# Patient Record
Sex: MALE | Race: WHITE | Employment: OTHER | URBAN - METROPOLITAN AREA
[De-identification: names, ages, dates, MRNs, and addresses within clinical notes are randomized per-mention and may not be internally consistent; named-entity substitution may affect disease eponyms.]

---

## 2017-01-09 DIAGNOSIS — I10 ESSENTIAL HYPERTENSION WITH GOAL BLOOD PRESSURE LESS THAN 140/90: Primary | ICD-10-CM

## 2017-01-09 NOTE — TELEPHONE ENCOUNTER
Potassium chloride      Last Written Prescription Date: 10/07/16  Last Fill Quantity: 90, # refills: 0  Last Office Visit with [facility], [facility] or [facility] prescribing provider: 11/21/16       POTASSIUM   Date Value Ref Range Status   11/21/2016 4.7 3.4 - 5.3 mmol/L Final     CREATININE   Date Value Ref Range Status   11/21/2016 1.02 0.66 - 1.25 mg/dL Final     BP Readings from Last 3 Encounters:   11/21/16 118/60   08/29/16 150/70   07/15/16 112/62

## 2017-01-12 RX ORDER — POTASSIUM CHLORIDE 750 MG/1
TABLET, EXTENDED RELEASE ORAL
Qty: 90 TABLET | Refills: 2 | Status: SHIPPED | OUTPATIENT
Start: 2017-01-12 | End: 2018-01-01

## 2017-02-12 DIAGNOSIS — E78.5 HYPERLIPIDEMIA WITH TARGET LDL LESS THAN 100: ICD-10-CM

## 2017-02-13 NOTE — TELEPHONE ENCOUNTER
ATORVASTATIN     Last Written Prescription Date: 7/28/16  Last Fill Quantity: 90, # refills: 1  Last Office Visit with [facility], P or [facility] prescribing provider: 11/21/16       Lab Results   Component Value Date    CHOL 121 11/21/2016     Lab Results   Component Value Date    HDL 49 11/21/2016     Lab Results   Component Value Date    LDL 51 11/21/2016     Lab Results   Component Value Date    TRIG 107 11/21/2016     Lab Results   Component Value Date    CHOLHDLRATIO 2.5 06/30/2015

## 2017-02-15 RX ORDER — ATORVASTATIN CALCIUM 40 MG/1
TABLET, FILM COATED ORAL
Qty: 90 TABLET | Refills: 2 | Status: SHIPPED | OUTPATIENT
Start: 2017-02-15 | End: 2017-12-03

## 2017-02-15 NOTE — TELEPHONE ENCOUNTER
Prescription approved per [facility] Refill Protocol.  Alyx Foss, RN, BSN

## 2017-03-02 DIAGNOSIS — I10 ESSENTIAL HYPERTENSION: Primary | ICD-10-CM

## 2017-03-02 NOTE — TELEPHONE ENCOUNTER
Lisinopril 40 mg      Last Written Prescription Date:   Last Fill Quantity: , # refills:   Last Office Visit with [facility], [facility] or [facility] prescribing provider: 11/21/2016       Potassium   Date Value Ref Range Status   11/21/2016 4.7 3.4 - 5.3 mmol/L Final     Creatinine   Date Value Ref Range Status   11/21/2016 1.02 0.66 - 1.25 mg/dL Final     BP Readings from Last 3 Encounters:   11/21/16 118/60   08/29/16 150/70   07/15/16 112/62

## 2017-03-03 RX ORDER — LISINOPRIL 40 MG/1
TABLET ORAL
Qty: 180 TABLET | Refills: 0 | Status: SHIPPED | OUTPATIENT
Start: 2017-03-03 | End: 2017-05-08

## 2017-03-03 NOTE — TELEPHONE ENCOUNTER
Routing refill request to provider for review/approval because:  Medication is reported/historical  Ayla Lr RN

## 2017-03-21 DIAGNOSIS — E11.65 TYPE 2 DIABETES MELLITUS WITH HYPERGLYCEMIA, WITHOUT LONG-TERM CURRENT USE OF INSULIN (H): Primary | ICD-10-CM

## 2017-03-21 NOTE — TELEPHONE ENCOUNTER
Jentadueto         Last Written Prescription Date: 8/5/2016  Last Fill Quantity: 180, # refills: 1  Last Office Visit with [facility], P or [facility] prescribing provider:  11/21/2016        BP Readings from Last 3 Encounters:   11/21/16 118/60   08/29/16 150/70   07/15/16 112/62     Lab Results   Component Value Date    MICROL 42 11/21/2016     No results found for: MICROALBUMIN  Creatinine   Date Value Ref Range Status   11/21/2016 1.02 0.66 - 1.25 mg/dL Final   ]  GFR Estimate   Date Value Ref Range Status   11/21/2016 74 >60 mL/min/1.7m2 Final     Comment:     Non  GFR Calc   01/19/2016 65 >60 mL/min/1.7m2 Final     Comment:     Non  GFR Calc   10/12/2015 >60 ml/min/1.73m2 Final     GFR Estimate If Black   Date Value Ref Range Status   11/21/2016 89 >60 mL/min/1.7m2 Final     Comment:      GFR Calc   01/19/2016 79 >60 mL/min/1.7m2 Final     Comment:      GFR Calc   10/12/2015 >60 ml/min/1.73m2 Final     Lab Results   Component Value Date    CHOL 121 11/21/2016     Lab Results   Component Value Date    HDL 49 11/21/2016     Lab Results   Component Value Date    LDL 51 11/21/2016     Lab Results   Component Value Date    TRIG 107 11/21/2016     Lab Results   Component Value Date    CHOLHDLRATIO 2.5 06/30/2015     Lab Results   Component Value Date    AST 10 11/21/2016     Lab Results   Component Value Date    ALT 22 11/21/2016     Lab Results   Component Value Date    A1C 5.7 11/21/2016    A1C 5.4 08/29/2016    A1C 5.5 05/17/2016    A1C 5.9 01/19/2016    A1C 5.8 06/30/2015     Potassium   Date Value Ref Range Status   11/21/2016 4.7 3.4 - 5.3 mmol/L Final       Gena Moreira MA

## 2017-03-23 RX ORDER — LINAGLIPTIN AND METFORMIN HYDROCHLORIDE 2.5; 1 MG/1; MG/1
TABLET, FILM COATED ORAL
Qty: 180 TABLET | Refills: 1 | Status: SHIPPED | OUTPATIENT
Start: 2017-03-23 | End: 2017-09-24

## 2017-03-23 NOTE — TELEPHONE ENCOUNTER
Jentadueto   Routing refill request to provider for review/approval because:  Labs out of range:  Microalbumin  Break in medication.  Frannie Hutchins RN

## 2017-04-10 DIAGNOSIS — E11.65 TYPE 2 DIABETES MELLITUS WITH HYPERGLYCEMIA, WITHOUT LONG-TERM CURRENT USE OF INSULIN (H): Primary | ICD-10-CM

## 2017-04-10 NOTE — TELEPHONE ENCOUNTER
actos         Last Written Prescription Date: 09/07/16  Last Fill Quantity: 90, # refills: 1  Last Office Visit with [facility], P or [facility] prescribing provider:  11/21/16        BP Readings from Last 3 Encounters:   11/21/16 118/60   08/29/16 150/70   07/15/16 112/62     Lab Results   Component Value Date    MICROL 42 11/21/2016     Lab Results   Component Value Date    UMALCR 18.87 11/21/2016     Creatinine   Date Value Ref Range Status   11/21/2016 1.02 0.66 - 1.25 mg/dL Final   ]  GFR Estimate   Date Value Ref Range Status   11/21/2016 74 >60 mL/min/1.7m2 Final     Comment:     Non  GFR Calc   01/19/2016 65 >60 mL/min/1.7m2 Final     Comment:     Non  GFR Calc   10/12/2015 >60 ml/min/1.73m2 Final     GFR Estimate If Black   Date Value Ref Range Status   11/21/2016 89 >60 mL/min/1.7m2 Final     Comment:      GFR Calc   01/19/2016 79 >60 mL/min/1.7m2 Final     Comment:      GFR Calc   10/12/2015 >60 ml/min/1.73m2 Final     Lab Results   Component Value Date    CHOL 121 11/21/2016     Lab Results   Component Value Date    HDL 49 11/21/2016     Lab Results   Component Value Date    LDL 51 11/21/2016     Lab Results   Component Value Date    TRIG 107 11/21/2016     Lab Results   Component Value Date    CHOLHDLRATIO 2.5 06/30/2015     Lab Results   Component Value Date    AST 10 11/21/2016     Lab Results   Component Value Date    ALT 22 11/21/2016     Lab Results   Component Value Date    A1C 5.7 11/21/2016    A1C 5.4 08/29/2016    A1C 5.5 05/17/2016    A1C 5.9 01/19/2016    A1C 5.8 06/30/2015     Potassium   Date Value Ref Range Status   11/21/2016 4.7 3.4 - 5.3 mmol/L Final

## 2017-04-12 RX ORDER — PIOGLITAZONEHYDROCHLORIDE 30 MG/1
TABLET ORAL
Qty: 90 TABLET | Refills: 1 | Status: SHIPPED | OUTPATIENT
Start: 2017-04-12 | End: 2017-10-16

## 2017-04-12 NOTE — TELEPHONE ENCOUNTER
Routing refill request to provider for review/approval because:  Labs out of range:  microalbumin  Patient needs to be seen because:  Due for follow up in January  Ayla Lr RN

## 2017-04-19 ENCOUNTER — TRANSFERRED RECORDS (OUTPATIENT)
Dept: HEALTH INFORMATION MANAGEMENT | Facility: CLINIC | Age: 62
End: 2017-04-19

## 2017-05-08 DIAGNOSIS — R10.13 DYSPEPSIA AND DISORDER OF FUNCTION OF STOMACH: ICD-10-CM

## 2017-05-08 DIAGNOSIS — K31.9 DYSPEPSIA AND DISORDER OF FUNCTION OF STOMACH: ICD-10-CM

## 2017-05-08 DIAGNOSIS — I10 ESSENTIAL HYPERTENSION WITH GOAL BLOOD PRESSURE LESS THAN 140/90: ICD-10-CM

## 2017-05-08 NOTE — TELEPHONE ENCOUNTER
lisinopril (PRINIVIL/ZESTRIL) 40 MG tablet      Last Written Prescription Date: 3/3/2017  Last Fill Quantity: 180, # refills: 0  Last Office Visit with [facility] or [facility] prescribing provider: 11/21/2016       Potassium   Date Value Ref Range Status   11/21/2016 4.7 3.4 - 5.3 mmol/L Final     Creatinine   Date Value Ref Range Status   11/21/2016 1.02 0.66 - 1.25 mg/dL Final     BP Readings from Last 3 Encounters:   11/21/16 118/60   08/29/16 150/70   07/15/16 112/62       amLODIPine (NORVASC) 10 MG tablet  T PO QD      Last Written Prescription Date: 6/30/2016 pt reported  Last Fill Quantity: , # refills:      Last Office Visit with [facility] or [facility] prescribing provider:  11/21/2016   Future Office Visit:        BP Readings from Last 3 Encounters:   11/21/16 118/60   08/29/16 150/70   07/15/16 112/62     carvedilol (COREG) 25 MG tablet      Last Written Prescription Date: 10/10/2016  Last Fill Quantity: 180, # refills: 1  Last Office Visit with [facility], [facility] or [facility] prescribing provider: 11/21/2016       Potassium   Date Value Ref Range Status   11/21/2016 4.7 3.4 - 5.3 mmol/L Final     Creatinine   Date Value Ref Range Status   11/21/2016 1.02 0.66 - 1.25 mg/dL Final     BP Readings from Last 3 Encounters:   11/21/16 118/60   08/29/16 150/70   07/15/16 112/62     famotidine (PEPCID) 40 MG tablet      Last Written Prescription Date: 6/1/2016  Last Fill Quantity: 90,  # refills: 3   Last Office Visit with [facility] prescribing provider: 11/21/2016

## 2017-05-09 RX ORDER — FAMOTIDINE 40 MG/1
TABLET, FILM COATED ORAL
Qty: 30 TABLET | Refills: 0 | Status: SHIPPED | OUTPATIENT
Start: 2017-05-09 | End: 2017-08-10

## 2017-05-09 RX ORDER — CARVEDILOL 25 MG/1
TABLET ORAL
Qty: 60 TABLET | Refills: 0 | Status: SHIPPED | OUTPATIENT
Start: 2017-05-09 | End: 2017-06-19

## 2017-05-09 RX ORDER — LISINOPRIL 40 MG/1
TABLET ORAL
Qty: 60 TABLET | Refills: 0 | Status: SHIPPED | OUTPATIENT
Start: 2017-05-09 | End: 2017-09-25

## 2017-05-09 RX ORDER — AMLODIPINE BESYLATE 10 MG/1
10 TABLET ORAL DAILY
Qty: 90 TABLET | Refills: 1 | Status: SHIPPED | OUTPATIENT
Start: 2017-05-09 | End: 2017-10-22

## 2017-05-09 NOTE — TELEPHONE ENCOUNTER
Amlodipine  Routing refill request to provider for review/approval because:  Medication is reported/historical      Lisinopril, carvedilol, Pepcid  Medication is being filled for 1 time refill only due to:  Patient needs to be seen because provided recommended follow up in 3 months..      Salma Serrano RN, BSN

## 2017-06-08 ENCOUNTER — TELEPHONE (OUTPATIENT)
Dept: FAMILY MEDICINE | Facility: CLINIC | Age: 62
End: 2017-06-08

## 2017-06-08 NOTE — TELEPHONE ENCOUNTER
Reviewed the findings. Pulse rate around  would be considered normal. May vary by hydration or other activities. If his pulse remains less than 110 and blood pressure remains normal, no additional medications appear Indicated. If his pulse continues to rise and is over 110 consistently, he should be seen in the clinic.    Javed Maier MD  Please close encounter when call/work completed.

## 2017-06-08 NOTE — TELEPHONE ENCOUNTER
Spoke with patient pulse has been  at rest, will go down to 88  Started about 3 weeks ago.   No headaches, no dizziness, no SOB. No chest pain.     Eating and drinking normally.    BPs as home have been around 135/80s     He is wondering if he needs to be on a med for his pulse?    Jorje Lopez, RN, BSN

## 2017-06-08 NOTE — TELEPHONE ENCOUNTER
Patient informed. Good news.  Would like upcoming appointment cancelled.    Jorje Lopez, RN, BSN

## 2017-06-19 DIAGNOSIS — I10 ESSENTIAL HYPERTENSION WITH GOAL BLOOD PRESSURE LESS THAN 140/90: ICD-10-CM

## 2017-06-19 NOTE — TELEPHONE ENCOUNTER
Patient was informed he needs to schedule an OV appt - he will call back to do so.    carvedilol (COREG) 25 MG tablet      Last Written Prescription Date: 5/9/2017  Last Fill Quantity: 60, # refills: 0  Last Office Visit with [facility] or [facility] prescribing provider: 11/21/2016       Potassium   Date Value Ref Range Status   11/21/2016 4.7 3.4 - 5.3 mmol/L Final     Creatinine   Date Value Ref Range Status   11/21/2016 1.02 0.66 - 1.25 mg/dL Final     BP Readings from Last 3 Encounters:   11/21/16 118/60   08/29/16 150/70   07/15/16 112/62

## 2017-06-21 RX ORDER — CARVEDILOL 25 MG/1
TABLET ORAL
Qty: 60 TABLET | Refills: 0 | Status: SHIPPED | OUTPATIENT
Start: 2017-06-21 | End: 2017-06-21

## 2017-06-21 RX ORDER — CARVEDILOL 25 MG/1
TABLET ORAL
Qty: 60 TABLET | Refills: 2 | Status: SHIPPED | OUTPATIENT
Start: 2017-06-21 | End: 2017-07-03

## 2017-06-21 NOTE — TELEPHONE ENCOUNTER
Routing refill request to provider for review/approval because:  Diane given x1 and patient did not follow up    Tiara Bill, RN, BSN

## 2017-06-26 NOTE — PROGRESS NOTES
SUBJECTIVE:                                                    Tolu Villeda is a 62 year old male who presents to clinic today for the following health issues:      Diabetes Follow-up    Patient is checking blood sugars: once daily.  Results are as follows:         am - around 100- range from .    Diabetic concerns: None     Symptoms of hypoglycemia (low blood sugar): none     Paresthesias (numbness or burning in feet) or sores: No     Date of last diabetic eye exam: patient is aware that he need one soon     Hyperlipidemia Follow-Up      Rate your low fat/cholesterol diet?: good    Taking statin?  Yes, no muscle aches from statin    Other lipid medications/supplements?:  none    Hypertension Follow-up      Outpatient blood pressures are being checked at home.  Results are 120-150/70-80.    Low Salt Diet: not monitoring salt      Amount of exercise or physical activity: None    Problems taking medications regularly: No    Medication side effects: none    Diet: regular (no restrictions)      Chronic Kidney Disease Follow-up      Current NSAID use?  No      Problem list and histories reviewed & adjusted, as indicated.  Additional history: as documented        Reviewed and updated as needed this visit by clinical staff       Reviewed and updated as needed this visit by Provider         ROS:  C: NEGATIVE for fever, chills, change in weight  I: NEGATIVE for worrisome rashes, moles or lesions  INTEGUMENTARY/SKIN: Controlled skin reaction from his chemotherapy with current medications  E: NEGATIVE for vision changes or irritation  E/M: NEGATIVE for ear, mouth and throat problems  R: NEGATIVE for significant cough or SOB  RESP: Continues ongoing care for left lung stage IV cancer, non-small cell. Minimal if any left lung/chest wall pain. No major shortness of breath. Continues with his oncologist.  CV: NEGATIVE for chest pain, palpitations or peripheral edema  GI: NEGATIVE for nausea, abdominal pain, heartburn,  "or change in bowel habits  GI: Currently colorectal screening. No recurrent GERD symptoms.  : NEGATIVE for frequency, dysuria, or hematuria   male : Stable urine flow and current medications.  M: NEGATIVE for significant arthralgias or myalgia  N: NEGATIVE for weakness, dizziness or paresthesias  E: NEGATIVE for temperature intolerance, skin/hair changes  H: NEGATIVE for bleeding problems  P: NEGATIVE for changes in mood or affect    OBJECTIVE:                                                    /56  Pulse 100  Temp 98.3  F (36.8  C) (Temporal)  Resp 16  Ht 5' 6.75\" (1.695 m)  Wt 180 lb (81.6 kg)  BMI 28.4 kg/m2  Body mass index is 28.4 kg/(m^2).  GENERAL: alert, active, no distress and cooperative  EYES: Eyes grossly normal to inspection, extraocular movements - intact, and PERRL  HENT: ear canals- normal; TMs- normal; Nose- normal; Mouth- no ulcers, no lesions  NECK: no tenderness, no adenopathy, no asymmetry, no masses, no stiffness; thyroid- normal to palpation  RESP: Occasional rhonchi heard in the left base, otherwise clear.  CV: regular rates and rhythm, normal S1 S2, no S3 or S4 and no murmur, no click or rub -  ABDOMEN: soft, no tenderness, no  hepatosplenomegaly, no masses, normal bowel sounds  MS: extremities- no gross deformities noted, no edema  SKIN: no suspicious lesions, no rashes  NEURO: strength and tone- normal, sensory exam- grossly normal, mentation- intact, speech- normal, reflexes- symmetric  BACK: no CVA tenderness, no paralumbar tenderness  - male: testicles- normal, no atrophy, no masses;  no inguinal hernias  PSYCH: Alert and oriented times 3; speech- coherent , normal rate and volume; able to articulate logical thoughts, able to abstract reason, no tangential thoughts, no hallucinations or delusions, affect- normal  LYMPHATICS: ant. cervical- normal, post. cervical- normal, axillary- normal, supraclavicular- normal, inguinal- normal    Diagnostic test results:  Diagnostic " Test Results:  Results for orders placed or performed in visit on 07/03/17 (from the past 24 hour(s))   Hemoglobin A1c   Result Value Ref Range    Hemoglobin A1C 5.7 4.3 - 6.0 %        ASSESSMENT/PLAN:                                                    1. Type 2 diabetes mellitus with hyperglycemia, without long-term current use of insulin (H)     F continues with good care on current medications. Only uses Glyburide on an occasional basis with high blood glucose readings. Continue current program.  - Lipid panel reflex to direct LDL  - Comprehensive metabolic panel  - Hemoglobin A1c    2. Essential hypertension with goal blood pressure less than 140/90   Blood pressure under good control.  - carvedilol (COREG) 25 MG tablet; TAKE 1 TABLET (25 MG) BY MOUTH 2 TIMES DAILY (WITH MEALS)  Dispense: 180 tablet; Refill: 2  - Comprehensive metabolic panel    3. Hyperlipidemia with target LDL less than 100    Awaiting lipid profile that has been in good control.  - Lipid panel reflex to direct LDL  - Comprehensive metabolic panel    4. Malignant neoplasm of lower lobe of left lung (H)   Continued follow-up with his oncologist. Stable on current medications.    5. Microalbuminuria   Rechecking levels.  - Albumin Random Urine Quantitative    6. Gastroesophageal reflux disease without esophagitis   Controlled with current medications.      Follow up with Provider - Follow-up in 3-4 months as planned. Fasting blood studies with next visit.   See Patient Instructions    The patient understood the rational for the diagnosis and treatment plan. All questions were answered to best of my ability and the patient's satisfaction.      Javed Maier MD  [facility]

## 2017-07-03 ENCOUNTER — OFFICE VISIT (OUTPATIENT)
Dept: FAMILY MEDICINE | Facility: CLINIC | Age: 62
End: 2017-07-03
Payer: COMMERCIAL

## 2017-07-03 VITALS
RESPIRATION RATE: 16 BRPM | BODY MASS INDEX: 28.25 KG/M2 | TEMPERATURE: 98.3 F | DIASTOLIC BLOOD PRESSURE: 56 MMHG | HEART RATE: 100 BPM | WEIGHT: 180 LBS | SYSTOLIC BLOOD PRESSURE: 130 MMHG | HEIGHT: 67 IN

## 2017-07-03 DIAGNOSIS — C34.32 MALIGNANT NEOPLASM OF LOWER LOBE OF LEFT LUNG (H): ICD-10-CM

## 2017-07-03 DIAGNOSIS — I10 ESSENTIAL HYPERTENSION WITH GOAL BLOOD PRESSURE LESS THAN 140/90: ICD-10-CM

## 2017-07-03 DIAGNOSIS — E78.5 HYPERLIPIDEMIA WITH TARGET LDL LESS THAN 100: ICD-10-CM

## 2017-07-03 DIAGNOSIS — E11.65 TYPE 2 DIABETES MELLITUS WITH HYPERGLYCEMIA, WITHOUT LONG-TERM CURRENT USE OF INSULIN (H): Primary | ICD-10-CM

## 2017-07-03 DIAGNOSIS — K21.9 GASTROESOPHAGEAL REFLUX DISEASE WITHOUT ESOPHAGITIS: ICD-10-CM

## 2017-07-03 DIAGNOSIS — R80.9 MICROALBUMINURIA: ICD-10-CM

## 2017-07-03 LAB
ALBUMIN SERPL-MCNC: 3.6 G/DL (ref 3.4–5)
ALP SERPL-CCNC: 77 U/L (ref 40–150)
ALT SERPL W P-5'-P-CCNC: 23 U/L (ref 0–70)
ANION GAP SERPL CALCULATED.3IONS-SCNC: 6 MMOL/L (ref 3–14)
AST SERPL W P-5'-P-CCNC: 12 U/L (ref 0–45)
BILIRUB SERPL-MCNC: 0.6 MG/DL (ref 0.2–1.3)
BUN SERPL-MCNC: 21 MG/DL (ref 7–30)
CALCIUM SERPL-MCNC: 8.8 MG/DL (ref 8.5–10.1)
CHLORIDE SERPL-SCNC: 107 MMOL/L (ref 94–109)
CHOLEST SERPL-MCNC: 118 MG/DL
CO2 SERPL-SCNC: 29 MMOL/L (ref 20–32)
CREAT SERPL-MCNC: 1.09 MG/DL (ref 0.66–1.25)
CREAT UR-MCNC: 114 MG/DL
GFR SERPL CREATININE-BSD FRML MDRD: 68 ML/MIN/1.7M2
GLUCOSE SERPL-MCNC: 115 MG/DL (ref 70–99)
HBA1C MFR BLD: 5.7 % (ref 4.3–6)
HDLC SERPL-MCNC: 55 MG/DL
LDLC SERPL CALC-MCNC: 43 MG/DL
MICROALBUMIN UR-MCNC: 30 MG/L
MICROALBUMIN/CREAT UR: 26.05 MG/G CR (ref 0–17)
NONHDLC SERPL-MCNC: 63 MG/DL
POTASSIUM SERPL-SCNC: 4.5 MMOL/L (ref 3.4–5.3)
PROT SERPL-MCNC: 6.6 G/DL (ref 6.8–8.8)
SODIUM SERPL-SCNC: 142 MMOL/L (ref 133–144)
TRIGL SERPL-MCNC: 100 MG/DL

## 2017-07-03 PROCEDURE — 80053 COMPREHEN METABOLIC PANEL: CPT | Performed by: FAMILY MEDICINE

## 2017-07-03 PROCEDURE — 36415 COLL VENOUS BLD VENIPUNCTURE: CPT | Performed by: FAMILY MEDICINE

## 2017-07-03 PROCEDURE — 83036 HEMOGLOBIN GLYCOSYLATED A1C: CPT | Performed by: FAMILY MEDICINE

## 2017-07-03 PROCEDURE — 99214 OFFICE O/P EST MOD 30 MIN: CPT | Performed by: FAMILY MEDICINE

## 2017-07-03 PROCEDURE — 80061 LIPID PANEL: CPT | Performed by: FAMILY MEDICINE

## 2017-07-03 PROCEDURE — 82043 UR ALBUMIN QUANTITATIVE: CPT | Performed by: FAMILY MEDICINE

## 2017-07-03 RX ORDER — CARVEDILOL 25 MG/1
TABLET ORAL
Qty: 180 TABLET | Refills: 2 | Status: SHIPPED | OUTPATIENT
Start: 2017-07-03 | End: 2018-01-01

## 2017-07-03 ASSESSMENT — PAIN SCALES - GENERAL: PAINLEVEL: NO PAIN (0)

## 2017-07-03 NOTE — MR AVS SNAPSHOT
After Visit Summary   7/3/2017    Tolu Villeda    MRN: [id number]           Patient Information     Date Of Birth          1955        Visit Information        Provider Department      7/3/2017 11:40 AM Javed Maier MD [facility]        Today's Diagnoses     Type 2 diabetes mellitus with hyperglycemia, without long-term current use of insulin (H)    -  1    Essential hypertension with goal blood pressure less than 140/90        Hyperlipidemia with target LDL less than 100        Malignant neoplasm of lower lobe of left lung (H)        Microalbuminuria        Gastroesophageal reflux disease without esophagitis          Care Instructions    These are new changes to your current plan of care based on today's visit:    Medications stopped    Medications to be started    Change dose of this medication None   New treatments        Follow up appointments:    1.  FOLLOW UP WITH YOUR PRIMARY CARE PROVIDER: 3 month(s) for clinic visit with fasting blood work    Javed Maier MD                Follow-ups after your visit        Follow-up notes from your care team     Return in about 3 years (around 7/3/2020) for Routine Visit, Lab Work.      Who to contact     If you have questions or need follow up information about today's clinic visit or your schedule please [facility] at [phone].  Normal or non-critical lab and imaging results will be communicated to you by MyChart, letter or phone within 4 business days after the clinic has received the results. If you do not hear from us within 7 days, please contact the clinic through MyChart or phone. If you have a critical or abnormal lab result, we will notify you by phone as soon as possible.  Submit refill requests through [facility] D[facility] or call your pharmacy and they will forward the refill request to us. Please allow 3 business days for your refill to be completed.          Additional Information About  "Your Visit        MyChart Information     [facility] gives you secure access to your electronic health record. If you see a primary care provider, you can also send messages to your care team and make appointments. If you have questions, please call your primary care clinic.  If you do not have a primary care provider, please call [phone] and they will assist you.        Care EveryWhere ID     This is your Care EveryWhere ID. This could be used by other organizations to access your [place] medical records  [id number]        Your Vitals Were     Pulse Temperature Respirations Height BMI (Body Mass Index)       100 98.3  F (36.8  C) (Temporal) 16 5' 6.75\" (1.695 m) 28.4 kg/m2        Blood Pressure from Last 3 Encounters:   07/03/17 130/56   11/21/16 118/60   08/29/16 150/70    Weight from Last 3 Encounters:   07/03/17 180 lb (81.6 kg)   11/21/16 181 lb (82.1 kg)   08/29/16 182 lb (82.6 kg)              We Performed the Following     Albumin Random Urine Quantitative     Comprehensive metabolic panel     Hemoglobin A1c     Lipid panel reflex to direct LDL          Today's Medication Changes          These changes are accurate as of: 7/3/17 12:09 PM.  If you have any questions, ask your nurse or doctor.               Stop taking these medicines if you haven't already. Please contact your care team if you have questions.     loperamide 2 MG tablet   Commonly known as:  IMODIUM A-D   Stopped by:  Javed Maier MD           pseudoePHEDrine 30 MG tablet   Commonly known as:  SUDAFED   Stopped by:  Javed Maier MD                Where to get your medicines      These medications were sent to [id number] - [place], [place] - [address]  [address] [facility] [address]     Phone:  [phone]     carvedilol 25 MG tablet                Primary Care Provider Office Phone # Fax #    Javed Maier [phone]       [facility] [id number] [place] [address] " [place] [address]        Equal Access to Services     [facility] : Hadii caitlin brunson raul Barry, waaxda luqadaha, qaybta kaalmada amira, cliff fordahsancarlos nobles. So [facility] [phone].    ATENCIÓN: Si habla español, tiene a ibanez disposición servicios gratuitos de asistencia lingüística. Griseldaame al [phone].    We comply with applicable federal civil rights laws and [place] laws. We do not discriminate on the basis of race, color, national origin, age, disability sex, sexual orientation or gender identity.            Thank you!     Thank you for choosing [facility]  for your care. Our goal is always to provide you with excellent care. Hearing back from our patients is one way we can continue to improve our services. Please take a few minutes to complete the written survey that you may receive in the mail after your visit with us. Thank you!             Your Updated Medication List - Protect others around you: Learn how to safely use, store and throw away your medicines at www.disposemymeds.org.          This list is accurate as of: 7/3/17 12:09 PM.  Always use your most recent med list.                   Brand Name Dispense Instructions for use Diagnosis    amLODIPine 10 MG tablet    NORVASC    90 tablet    Take 1 tablet (10 mg) by mouth daily    Essential hypertension with goal blood pressure less than 140/90       AQUAPHOR ADVANCED THERAPY Oint     30 g    Externally apply 1 dose topically 2 times daily    Adenocarcinoma of lung, left (H), Acneiform rash       ASPIRIN NOT PRESCRIBED    INTENTIONAL    0 each    1 each continuous prn Antiplatelet medication not prescribed intentionally due to oncology therapy.        atorvastatin 40 MG tablet    LIPITOR    90 tablet    TAKE ONE TABLET BY MOUTH ONE TIME DAILY    Hyperlipidemia with target LDL less than 100       blood glucose monitoring test strip    DEISY CONTOUR    200 strip    3 times daily.    Type 2 diabetes, HbA1c goal < 7% (H)        carvedilol 25 MG tablet    COREG    180 tablet    TAKE 1 TABLET (25 MG) BY MOUTH 2 TIMES DAILY (WITH MEALS)    Essential hypertension with goal blood pressure less than 140/90       clindamycin 1 % topical gel    CLINDAMAX    30 g    Use on face 2 times a day    Acne       clobetasol propionate 0.05 % Sham    CLOBEX    118 mL    Externally apply 1 Bottle topically daily Apply to scalp prior to showering.  Leave on for 15 min    Dry scalp, Adenocarcinoma of lung, unspecified laterality (H)       diphenoxylate-atropine 2.5-0.025 MG per tablet    LOMOTIL    40 tablet    Take 1 tablet by mouth 4 times daily as needed for diarrhea    Acute diarrhea       famotidine 40 MG tablet    PEPCID    30 tablet    TAKE 1 TABLET (40 MG) BY MOUTH AT BEDTIME    Dyspepsia and disorder of function of stomach       fluocinonide 0.05 % cream    LIDEX          glyBURIDE 5 MG tablet    DIABETA /MICRONASE          HYDROcodone-acetaminophen 5-325 MG per tablet    NORCO     Take 1 tablet by mouth every 4 hours as needed for moderate to severe pain        JENTADUETO 2.5-1000 MG per tablet   Generic drug:  linagliptin-metFORMIN     180 tablet    TAKE ONE TABLET BY MOUTH TWICE DAILY WITH FOOD    Type 2 diabetes mellitus with hyperglycemia, without long-term current use of insulin (H)       lisinopril 40 MG tablet    PRINIVIL/ZESTRIL    60 tablet    TAKE 1 TABLET (40 MG) BY MOUTH 2 TIMES DAILY        minocycline 100 MG tablet    DYNACIN    28 tablet    Take 1 tablet (100 mg) by mouth daily    Acneiform rash       mometasone 0.1 % cream    ELOCON    45 g    APPLY 1 GRAM TOPICALLY DAILY    Perioral dermatitis       MULTIVITAMIN PO      1 TABLET DAILY        nystatin cream    MYCOSTATIN          * order for DME     1 Units    Equipment being ordered: Home Glucometer    Type 2 diabetes, HbA1c goal < 7% (H)       * order for DME     400 strip    Equipment being ordered: Glucometer test strips for three times daily testing--fill with appropriate  brand for the new glucometer    Type 2 diabetes, HbA1c goal < 7% (H)       osimertinib 80 MG tablet    TAGRISSO     Take 80 mg by mouth daily        pioglitazone 30 MG tablet    ACTOS    90 tablet    TAKE ONE TABLET BY MOUTH ONE TIME DAILY    Type 2 diabetes mellitus with hyperglycemia, without long-term current use of insulin (H)       potassium chloride 10 MEQ CR tablet   Generic drug:  potassium chloride SA     90 tablet    TAKE ONE TABLET BY MOUTH ONE TIME DAILY    Essential hypertension with goal blood pressure less than 140/90       prochlorperazine 10 MG tablet    COMPAZINE          sulfacetamide 10 % ophthalmic solution    BLEPH-10     Apply 1 drop to eye every 2 hours (while awake)        tamsulosin 0.4 MG capsule    FLOMAX    90 capsule    TAKE 1 CAPSULE (0.4 MG) BY MOUTH DAILY    Enlarged prostate with lower urinary tract symptoms (LUTS)       * Notice:  This list has 2 medication(s) that are the same as other medications prescribed for you. Read the directions carefully, and ask your doctor or other care provider to review them with you.

## 2017-07-03 NOTE — PATIENT INSTRUCTIONS
These are new changes to your current plan of care based on today's visit:    Medications stopped    Medications to be started    Change dose of this medication None   New treatments        Follow up appointments:    1.  FOLLOW UP WITH YOUR PRIMARY CARE PROVIDER: 3 month(s) for clinic visit with fasting blood work    Javed Maier MD

## 2017-07-03 NOTE — NURSING NOTE
"Chief Complaint   Patient presents with     Diabetes     Lipids     Hypertension     Panel Management     A1c       Initial /56  Pulse 100  Temp 98.3  F (36.8  C) (Temporal)  Resp 16  Ht 5' 6.75\" (1.695 m)  Wt 180 lb (81.6 kg)  BMI 28.4 kg/m2 Estimated body mass index is 28.4 kg/(m^2) as calculated from the following:    Height as of this encounter: 5' 6.75\" (1.695 m).    Weight as of this encounter: 180 lb (81.6 kg).  Medication Reconciliation: complete       Bebo Hidalgo MA    "

## 2017-07-26 ENCOUNTER — TRANSFERRED RECORDS (OUTPATIENT)
Dept: HEALTH INFORMATION MANAGEMENT | Facility: CLINIC | Age: 62
End: 2017-07-26

## 2017-08-10 ENCOUNTER — TELEPHONE (OUTPATIENT)
Dept: FAMILY MEDICINE | Facility: CLINIC | Age: 62
End: 2017-08-10

## 2017-08-10 DIAGNOSIS — K31.9 DYSPEPSIA AND DISORDER OF FUNCTION OF STOMACH: ICD-10-CM

## 2017-08-10 DIAGNOSIS — R10.13 DYSPEPSIA AND DISORDER OF FUNCTION OF STOMACH: ICD-10-CM

## 2017-08-10 RX ORDER — FAMOTIDINE 40 MG/1
TABLET, FILM COATED ORAL
Qty: 90 TABLET | Refills: 3 | Status: SHIPPED | OUTPATIENT
Start: 2017-08-10 | End: 2018-01-01

## 2017-08-10 NOTE — TELEPHONE ENCOUNTER
Reason for call:  Patient states that he usually gets 30 days of his pepcid and he usually gets a 90 day supply.  He also said the pharmacy would not fill more until he was seen which he was 3 weeks ago.  [facility] in [place] [address]    
Refilled ninety days.    Javed Maier MD    
Breath sounds clear and equal bilaterally.

## 2017-08-26 DIAGNOSIS — N40.1 ENLARGED PROSTATE WITH LOWER URINARY TRACT SYMPTOMS (LUTS): ICD-10-CM

## 2017-08-28 RX ORDER — TAMSULOSIN HYDROCHLORIDE 0.4 MG/1
CAPSULE ORAL
Qty: 90 CAPSULE | Refills: 3 | Status: SHIPPED | OUTPATIENT
Start: 2017-08-28 | End: 2018-01-01

## 2017-08-28 NOTE — TELEPHONE ENCOUNTER
tamsulosin (FLOMAX) 0.4 MG 24 hr capsule         Last Written Prescription Date: 7/21/16  Last Fill Quantity: 90, # refills: 3    Last Office Visit with [facility], P or [facility] prescribing provider:  7/3/17   Future Office Visit:      BP Readings from Last 3 Encounters:   07/03/17 130/56   11/21/16 118/60   08/29/16 150/70

## 2017-09-24 DIAGNOSIS — E11.65 TYPE 2 DIABETES MELLITUS WITH HYPERGLYCEMIA, WITHOUT LONG-TERM CURRENT USE OF INSULIN (H): ICD-10-CM

## 2017-09-25 DIAGNOSIS — I10 ESSENTIAL HYPERTENSION WITH GOAL BLOOD PRESSURE LESS THAN 140/90: Primary | ICD-10-CM

## 2017-09-25 RX ORDER — LINAGLIPTIN AND METFORMIN HYDROCHLORIDE 2.5; 1 MG/1; MG/1
TABLET, FILM COATED ORAL
Qty: 180 TABLET | Refills: 0 | Status: SHIPPED | OUTPATIENT
Start: 2017-09-25 | End: 2018-01-01

## 2017-09-25 RX ORDER — LISINOPRIL 40 MG/1
TABLET ORAL
Qty: 60 TABLET | Refills: 5 | Status: SHIPPED | OUTPATIENT
Start: 2017-09-25 | End: 2018-01-01

## 2017-09-25 NOTE — TELEPHONE ENCOUNTER
Prescription approved per [facility] [place] Refill Protocol.    Lisset Barron RN

## 2017-09-25 NOTE — TELEPHONE ENCOUNTER
JENTADUETO 2.5-1000 MG per tablet         Last Written Prescription Date: 3/23/2017  Last Fill Quantity: 180, # refills: 1  Last Office Visit with [facility], [facility] or [facility] prescribing provider:  7/3/2017        BP Readings from Last 3 Encounters:   07/03/17 130/56   11/21/16 118/60   08/29/16 150/70     Lab Results   Component Value Date    MICROL 30 07/03/2017     Lab Results   Component Value Date    UMALCR 26.05 07/03/2017     Creatinine   Date Value Ref Range Status   07/03/2017 1.09 0.66 - 1.25 mg/dL Final   ]  GFR Estimate   Date Value Ref Range Status   07/03/2017 68 >60 mL/min/1.7m2 Final     Comment:     Non  GFR Calc   11/21/2016 74 >60 mL/min/1.7m2 Final     Comment:     Non  GFR Calc   01/19/2016 65 >60 mL/min/1.7m2 Final     Comment:     Non  GFR Calc     GFR Estimate If Black   Date Value Ref Range Status   07/03/2017 82 >60 mL/min/1.7m2 Final     Comment:      GFR Calc   11/21/2016 89 >60 mL/min/1.7m2 Final     Comment:      GFR Calc   01/19/2016 79 >60 mL/min/1.7m2 Final     Comment:      GFR Calc     Lab Results   Component Value Date    CHOL 118 07/03/2017     Lab Results   Component Value Date    HDL 55 07/03/2017     Lab Results   Component Value Date    LDL 43 07/03/2017     Lab Results   Component Value Date    TRIG 100 07/03/2017     Lab Results   Component Value Date    CHOLHDLRATIO 2.5 06/30/2015     Lab Results   Component Value Date    AST 12 07/03/2017     Lab Results   Component Value Date    ALT 23 07/03/2017     Lab Results   Component Value Date    A1C 5.7 07/03/2017    A1C 5.7 11/21/2016    A1C 5.4 08/29/2016    A1C 5.5 05/17/2016    A1C 5.9 01/19/2016     Potassium   Date Value Ref Range Status   07/03/2017 4.5 3.4 - 5.3 mmol/L Final     Cami Mar CMA  September 25, 2017

## 2017-10-15 DIAGNOSIS — I10 ESSENTIAL HYPERTENSION WITH GOAL BLOOD PRESSURE LESS THAN 140/90: ICD-10-CM

## 2017-10-16 DIAGNOSIS — E11.65 TYPE 2 DIABETES MELLITUS WITH HYPERGLYCEMIA, WITHOUT LONG-TERM CURRENT USE OF INSULIN (H): ICD-10-CM

## 2017-10-16 NOTE — TELEPHONE ENCOUNTER
actos         Last Written Prescription Date: 04/12/17  Last Fill Quantity: 90, # refills: 1  Last Office Visit with G, P or [facility] prescribing provider:  07/03/17        BP Readings from Last 3 Encounters:   07/03/17 130/56   11/21/16 118/60   08/29/16 150/70     Lab Results   Component Value Date    MICROL 30 07/03/2017     Lab Results   Component Value Date    UMALCR 26.05 07/03/2017     Creatinine   Date Value Ref Range Status   07/03/2017 1.09 0.66 - 1.25 mg/dL Final   ]  GFR Estimate   Date Value Ref Range Status   07/03/2017 68 >60 mL/min/1.7m2 Final     Comment:     Non  GFR Calc   11/21/2016 74 >60 mL/min/1.7m2 Final     Comment:     Non  GFR Calc   01/19/2016 65 >60 mL/min/1.7m2 Final     Comment:     Non  GFR Calc     GFR Estimate If Black   Date Value Ref Range Status   07/03/2017 82 >60 mL/min/1.7m2 Final     Comment:      GFR Calc   11/21/2016 89 >60 mL/min/1.7m2 Final     Comment:      GFR Calc   01/19/2016 79 >60 mL/min/1.7m2 Final     Comment:      GFR Calc     Lab Results   Component Value Date    CHOL 118 07/03/2017     Lab Results   Component Value Date    HDL 55 07/03/2017     Lab Results   Component Value Date    LDL 43 07/03/2017     Lab Results   Component Value Date    TRIG 100 07/03/2017     Lab Results   Component Value Date    CHOLHDLRATIO 2.5 06/30/2015     Lab Results   Component Value Date    AST 12 07/03/2017     Lab Results   Component Value Date    ALT 23 07/03/2017     Lab Results   Component Value Date    A1C 5.7 07/03/2017    A1C 5.7 11/21/2016    A1C 5.4 08/29/2016    A1C 5.5 05/17/2016    A1C 5.9 01/19/2016     Potassium   Date Value Ref Range Status   07/03/2017 4.5 3.4 - 5.3 mmol/L Final

## 2017-10-17 RX ORDER — AMLODIPINE BESYLATE 10 MG/1
TABLET ORAL
Qty: 90 TABLET | Refills: 0 | OUTPATIENT
Start: 2017-10-17

## 2017-10-17 NOTE — TELEPHONE ENCOUNTER
amLODIPine (NORVASC) 10 MG tablet      Last Written Prescription Date: 05/09/17  Last Fill Quantity: 90, # refills: 1  Last Office Visit with G, [facility] or [facility] prescribing provider: 07/03/17       Potassium   Date Value Ref Range Status   07/03/2017 4.5 3.4 - 5.3 mmol/L Final     Creatinine   Date Value Ref Range Status   07/03/2017 1.09 0.66 - 1.25 mg/dL Final     BP Readings from Last 3 Encounters:   07/03/17 130/56   11/21/16 118/60   08/29/16 150/70

## 2017-10-17 NOTE — TELEPHONE ENCOUNTER
Sent 5/9/17 with 6 months - should have enough through early November. He is due for a follow up visit now, please help schedule prior to running out of medication.     Tiara Bill, RN, BSN

## 2017-10-18 RX ORDER — PIOGLITAZONEHYDROCHLORIDE 30 MG/1
TABLET ORAL
Qty: 90 TABLET | Refills: 0 | Status: SHIPPED | OUTPATIENT
Start: 2017-10-18 | End: 2018-01-31

## 2017-10-18 NOTE — TELEPHONE ENCOUNTER
Medication is being filled for 1 time refill only due to:  Patient needs to be seen because due for DM OV in DEc..     Needs appointment for further refills.  Please call and help schedule.  Jorje Lopez, RN, BSN

## 2017-10-21 DIAGNOSIS — I10 ESSENTIAL HYPERTENSION WITH GOAL BLOOD PRESSURE LESS THAN 140/90: Primary | ICD-10-CM

## 2017-10-22 DIAGNOSIS — I10 ESSENTIAL HYPERTENSION WITH GOAL BLOOD PRESSURE LESS THAN 140/90: ICD-10-CM

## 2017-10-23 RX ORDER — AMLODIPINE BESYLATE 10 MG/1
TABLET ORAL
Qty: 90 TABLET | Refills: 0 | Status: SHIPPED | OUTPATIENT
Start: 2017-10-23 | End: 2017-10-24

## 2017-10-23 NOTE — PROGRESS NOTES
SUBJECTIVE:                                                    Tolu Villeda is a 62 year old male who presents to clinic today for the following health issues:      Diabetes Follow-up    Patient is checking blood sugars: once daily.  Results are as follows:       bedtime - 125        Diabetic concerns: None     Symptoms of hypoglycemia (low blood sugar): none     Paresthesias (numbness or burning in feet) or sores: No     Date of last diabetic eye exam: over 1 year ago  Hypertension Follow-up      Outpatient blood pressures are being checked at home.  Results are 145-85 at night .    Low Salt Diet: no added salt          Amount of exercise or physical activity: 4-5 days/week for an average of greater than 60 minutes    Problems taking medications regularly: No    Medication side effects: none  Diet: regular (no restrictions)      Hyperlipidemia Follow-Up      Rate your low fat/cholesterol diet?: not monitoring fat    Taking statin?  Yes, no muscle aches from statin    Other lipid medications/supplements?:  none          Problem list and histories reviewed & adjusted, as indicated.  Additional history: as documented        ROS:  C: NEGATIVE for fever, chills, change in weight  I: NEGATIVE for worrisome rashes, moles or lesions  E: NEGATIVE for vision changes or irritation  E/M: NEGATIVE for ear, mouth and throat problems  RESP: Continues with ongoing oral chemotherapy for adenocarcinoma of the left lung. Is not curable but has been remaining in check over the last 4 years. He is tolerating medication reasonably well. He follows with his oncologist on a regular basis. No major shortness of breath. He has been a nonsmoker since diagnosis.  CV: NEGATIVE for chest pain, palpitations or peripheral edema  CV: Blood pressure appears to be well maintained  GI: NEGATIVE for nausea, abdominal pain, heartburn, or change in bowel habits  : NEGATIVE for frequency, dysuria, or hematuria  M: NEGATIVE for significant  "arthralgias or myalgia  N: NEGATIVE for weakness, dizziness or paresthesias  E: NEGATIVE for temperature intolerance, skin/hair changes  ENDOCRINE: Ongoing care for type 2 diabetes, non-insulin-dependent. No major hypoglycemic reactions.  H: NEGATIVE for bleeding problems  P: NEGATIVE for changes in mood or affect  PSYCHIATRIC: Relatively little depression. Currently living with his daughter. Stable emotionally.    OBJECTIVE:                                                    /58  Pulse 80  Temp 98.3  F (36.8  C) (Temporal)  Resp 14  Ht 5' 6\" (1.676 m)  Wt 185 lb (83.9 kg)  SpO2 100%  BMI 29.86 kg/m2  Body mass index is 29.86 kg/(m^2).  GENERAL: healthy, alert and no distress  EYES: Eyes grossly normal to inspection, extraocular movements - intact, and PERRL  HENT: ear canals- normal; TMs- normal; Nose- normal; Mouth- no ulcers, no lesions  NECK: no tenderness, no adenopathy, no asymmetry, no masses, no stiffness; thyroid- normal to palpation  RESP: lungs clear to auscultation - no rales, no rhonchi, no wheezes  RESP: decreased breath sounds in left lung base posteriorly  BREAST: no masses, no tenderness, no nipple discharge, no palpable axillary masses or adenopathy  CV: regular rates and rhythm, normal S1 S2, no S3 or S4 and no murmur, no click or rub -  ABDOMEN: soft, no tenderness, no  hepatosplenomegaly, no masses, normal bowel sounds  MS: extremities- no gross deformities noted, no edema  SKIN: no suspicious lesions, no rashes  NEURO: strength and tone- normal, sensory exam- grossly normal, mentation- intact, speech- normal, reflexes- symmetric  Diabetic foot exam: normal DP and PT pulses, no trophic changes or ulcerative lesions and normal sensory exam  BACK: no CVA tenderness, no paralumbar tenderness  PSYCH: Alert and oriented times 3; speech- coherent , normal rate and volume; able to articulate logical thoughts, able to abstract reason, no tangential thoughts, no hallucinations or delusions, " affect- normal  LYMPHATICS: ant. cervical- normal, post. cervical- normal, axillary- normal, supraclavicular- normal, inguinal- normal    Diagnostic test results:  Diagnostic Test Results:  Results for orders placed or performed in visit on 10/24/17 (from the past 24 hour(s))   Hemoglobin A1c   Result Value Ref Range    Hemoglobin A1C 5.5 4.3 - 6.0 %   *UA reflex to Microscopic   Result Value Ref Range    Color Urine Yellow     Appearance Urine Clear     Glucose Urine Negative NEG^Negative mg/dL    Bilirubin Urine Negative NEG^Negative    Ketones Urine Negative NEG^Negative mg/dL    Specific Gravity Urine 1.020 1.003 - 1.035    Blood Urine Trace (A) NEG^Negative    pH Urine 5.5 5.0 - 7.0 pH    Protein Albumin Urine Trace (A) NEG^Negative mg/dL    Urobilinogen Urine 0.2 0.2 - 1.0 EU/dL    Nitrite Urine Negative NEG^Negative    Leukocyte Esterase Urine Negative NEG^Negative    Source Unspecified Urine    Urine Microscopic   Result Value Ref Range    WBC Urine O - 2 OTO2^O - 2 /HPF    RBC Urine O - 2 OTO2^O - 2 /HPF    Mucous Urine Present (A) NEG^Negative /LPF          ASSESSMENT/PLAN:                                                      (E11.65) Type 2 diabetes mellitus with hyperglycemia, without long-term current use of insulin (H)  (primary encounter diagnosis)  Comment:  A1c remains well controlled. No hypoglycemic reactions noted. Continue current medications and follow-up in 4 months.  Plan: Hemoglobin A1c, Basic metabolic panel, Urine         Microscopic            (I10) Essential hypertension with goal blood pressure less than 140/90  Comment: Blood pressure adequately maintained  Plan: amlodipine (NORVASC) 10 MG tablet, Albumin         Random Urine Quantitative with Creat Ratio,         Basic metabolic panel, *UA reflex to         Microscopic            (C34.92) Adenocarcinoma of left lung (H)  Comment: Followed by his oncologist. The history is remaining stable with cancer controlled. Not curable.  Plan:      (R80.9) Microalbuminuria  Comment: Continue to monitor.  Plan: Albumin Random Urine Quantitative with Creat         Ratio, *UA reflex to Microscopic            (E78.5) Hyperlipidemia with target LDL less than 100  Comment:  Lipids adequately controlled with current statin therapy  Plan:     (K21.9) Gastroesophageal reflux disease without esophagitis  Comment: Reports no breakthrough symptoms  Plan:     (E66.3) Patient overweight  Comment: Mildly overweight.  Plan: No active dietary plans other than controlled calories.        Follow up with Provider - Follow-up in 4 months or as needed.   See Patient Instructions    The patient understood the rational for the diagnosis and treatment plan. All questions were answered to best of my ability and the patient's satisfaction.    Note: Chart documentation done in part with Dragon Voice Recognition software. Although reviewed after completion, some word and grammatical errors may remain.  Please consider this when interpreting information in this chart.      Javed Maier MD  [facility]    Body mass index is 29.86 kg/(m^2).  Weight management plan: Discussed healthy diet and exercise guidelines and patient will follow up in 6 months in clinic to re-evaluate.

## 2017-10-23 NOTE — TELEPHONE ENCOUNTER
Reason for Call:  Medication or medication refill:    Do you use a [facility]?  Name of the pharmacy and phone number for the current request:  [facility] in [place]    Name of the medication requested: amlodopine    Other request: patient calling to check status of refill. Has appt tomorrow but will need today. Please call him asap today    Can we leave a detailed message on this number? YES    Phone number patient can be reached at: Home number on file [phone] (home)    Best Time: any    Call taken on 10/23/2017 at 12:38 PM by Katya Mehta

## 2017-10-24 ENCOUNTER — OFFICE VISIT (OUTPATIENT)
Dept: FAMILY MEDICINE | Facility: CLINIC | Age: 62
End: 2017-10-24
Payer: COMMERCIAL

## 2017-10-24 VITALS
RESPIRATION RATE: 14 BRPM | HEART RATE: 80 BPM | SYSTOLIC BLOOD PRESSURE: 114 MMHG | OXYGEN SATURATION: 100 % | DIASTOLIC BLOOD PRESSURE: 58 MMHG | TEMPERATURE: 98.3 F | WEIGHT: 185 LBS | BODY MASS INDEX: 29.73 KG/M2 | HEIGHT: 66 IN

## 2017-10-24 DIAGNOSIS — R80.9 MICROALBUMINURIA: ICD-10-CM

## 2017-10-24 DIAGNOSIS — E78.5 HYPERLIPIDEMIA WITH TARGET LDL LESS THAN 100: ICD-10-CM

## 2017-10-24 DIAGNOSIS — K21.9 GASTROESOPHAGEAL REFLUX DISEASE WITHOUT ESOPHAGITIS: ICD-10-CM

## 2017-10-24 DIAGNOSIS — I10 ESSENTIAL HYPERTENSION WITH GOAL BLOOD PRESSURE LESS THAN 140/90: ICD-10-CM

## 2017-10-24 DIAGNOSIS — E11.65 TYPE 2 DIABETES MELLITUS WITH HYPERGLYCEMIA, WITHOUT LONG-TERM CURRENT USE OF INSULIN (H): Primary | ICD-10-CM

## 2017-10-24 DIAGNOSIS — E66.3 PATIENT OVERWEIGHT: ICD-10-CM

## 2017-10-24 DIAGNOSIS — C34.92 ADENOCARCINOMA OF LEFT LUNG (H): ICD-10-CM

## 2017-10-24 LAB
ALBUMIN UR-MCNC: ABNORMAL MG/DL
ANION GAP SERPL CALCULATED.3IONS-SCNC: 5 MMOL/L (ref 3–14)
APPEARANCE UR: CLEAR
BILIRUB UR QL STRIP: NEGATIVE
BUN SERPL-MCNC: 24 MG/DL (ref 7–30)
CALCIUM SERPL-MCNC: 8.9 MG/DL (ref 8.5–10.1)
CHLORIDE SERPL-SCNC: 106 MMOL/L (ref 94–109)
CO2 SERPL-SCNC: 30 MMOL/L (ref 20–32)
COLOR UR AUTO: YELLOW
CREAT SERPL-MCNC: 1.07 MG/DL (ref 0.66–1.25)
CREAT UR-MCNC: 124 MG/DL
GFR SERPL CREATININE-BSD FRML MDRD: 69 ML/MIN/1.7M2
GLUCOSE SERPL-MCNC: 116 MG/DL (ref 70–99)
GLUCOSE UR STRIP-MCNC: NEGATIVE MG/DL
HBA1C MFR BLD: 5.5 % (ref 4.3–6)
HGB UR QL STRIP: ABNORMAL
KETONES UR STRIP-MCNC: NEGATIVE MG/DL
LEUKOCYTE ESTERASE UR QL STRIP: NEGATIVE
MICROALBUMIN UR-MCNC: 57 MG/L
MICROALBUMIN/CREAT UR: 45.97 MG/G CR (ref 0–17)
MUCOUS THREADS #/AREA URNS LPF: PRESENT /LPF
NITRATE UR QL: NEGATIVE
PH UR STRIP: 5.5 PH (ref 5–7)
POTASSIUM SERPL-SCNC: 4.7 MMOL/L (ref 3.4–5.3)
RBC #/AREA URNS AUTO: ABNORMAL /HPF
SODIUM SERPL-SCNC: 141 MMOL/L (ref 133–144)
SOURCE: ABNORMAL
SP GR UR STRIP: 1.02 (ref 1–1.03)
UROBILINOGEN UR STRIP-ACNC: 0.2 EU/DL (ref 0.2–1)
WBC #/AREA URNS AUTO: ABNORMAL /HPF

## 2017-10-24 PROCEDURE — 36415 COLL VENOUS BLD VENIPUNCTURE: CPT | Performed by: FAMILY MEDICINE

## 2017-10-24 PROCEDURE — 99214 OFFICE O/P EST MOD 30 MIN: CPT | Performed by: FAMILY MEDICINE

## 2017-10-24 PROCEDURE — 81001 URINALYSIS AUTO W/SCOPE: CPT | Performed by: FAMILY MEDICINE

## 2017-10-24 PROCEDURE — 80048 BASIC METABOLIC PNL TOTAL CA: CPT | Performed by: FAMILY MEDICINE

## 2017-10-24 PROCEDURE — 82043 UR ALBUMIN QUANTITATIVE: CPT | Performed by: FAMILY MEDICINE

## 2017-10-24 PROCEDURE — 83036 HEMOGLOBIN GLYCOSYLATED A1C: CPT | Performed by: FAMILY MEDICINE

## 2017-10-24 RX ORDER — AMLODIPINE BESYLATE 10 MG/1
10 TABLET ORAL DAILY
Qty: 90 TABLET | Refills: 1 | Status: SHIPPED | OUTPATIENT
Start: 2017-10-24 | End: 2017-12-29

## 2017-10-24 RX ORDER — POTASSIUM CHLORIDE 750 MG/1
TABLET, EXTENDED RELEASE ORAL
Qty: 90 TABLET | Refills: 2 | Status: SHIPPED | OUTPATIENT
Start: 2017-10-24 | End: 2018-01-01

## 2017-10-24 ASSESSMENT — PAIN SCALES - GENERAL: PAINLEVEL: NO PAIN (0)

## 2017-10-24 NOTE — MR AVS SNAPSHOT
After Visit Summary   10/24/2017    Tolu Villeda    MRN: [id number]           Patient Information     Date Of Birth          1955        Visit Information        Provider Department      10/24/2017 11:00 AM Javed Maier MD [facility]        Today's Diagnoses     Type 2 diabetes mellitus with hyperglycemia, without long-term current use of insulin (H)    -  1    Essential hypertension with goal blood pressure less than 140/90        Adenocarcinoma of left lung (H)        Microalbuminuria        Hyperlipidemia with target LDL less than 100        Gastroesophageal reflux disease without esophagitis           Follow-ups after your visit        Follow-up notes from your care team     Return in about 4 years (around 10/24/2021) for Lab Work, Routine Visit.      Who to contact     If you have questions or need follow up information about today's clinic visit or your schedule please contact [facility] directly at [phone].  Normal or non-critical lab and imaging results will be communicated to you by MyChart, letter or phone within 4 business days after the clinic has received the results. If you do not hear from us within 7 days, please contact the clinic through [employer] or phone. If you have a critical or abnormal lab result, we will notify you by phone as soon as possible.  Submit refill requests through [employer] or call your pharmacy and they will forward the refill request to us. Please allow 3 business days for your refill to be completed.          Additional Information About Your Visit        MyChart Information     [employer] gives you secure access to your electronic health record. If you see a primary care provider, you can also send messages to your care team and make appointments. If you have questions, please call your primary care clinic.  If you do not have a primary care provider, please call [phone] and they will assist you.        Care  "EveryWhere ID     This is your Care EveryWhere ID. This could be used by other organizations to access your [place] medical records  [id number]        Your Vitals Were     Pulse Temperature Respirations Height Pulse Oximetry BMI (Body Mass Index)    80 98.3  F (36.8  C) (Temporal) 14 5' 6\" (1.676 m) 100% 29.86 kg/m2       Blood Pressure from Last 3 Encounters:   10/24/17 114/58   07/03/17 130/56   11/21/16 118/60    Weight from Last 3 Encounters:   10/24/17 185 lb (83.9 kg)   07/03/17 180 lb (81.6 kg)   11/21/16 181 lb (82.1 kg)              We Performed the Following     *UA reflex to Microscopic     Albumin Random Urine Quantitative with Creat Ratio     Basic metabolic panel     Hemoglobin A1c          Today's Medication Changes          These changes are accurate as of: 10/24/17 11:34 AM.  If you have any questions, ask your nurse or doctor.               These medicines have changed or have updated prescriptions.        Dose/Directions    amLODIPine 10 MG tablet   Commonly known as:  NORVASC   This may have changed:  See the new instructions.   Used for:  Essential hypertension with goal blood pressure less than 140/90   Changed by:  Javed Maier MD        Dose:  10 mg   Take 1 tablet (10 mg) by mouth daily   Quantity:  90 tablet   Refills:  1            Where to get your medicines      These medications were sent to [id number] - [place], [place] - [address]  [address] [address]     Phone:  [phone]     amLODIPine 10 MG tablet                Primary Care Provider Office Phone # Fax #    Javed Maier [phone] [address] [facility] [address]        Equal Access to Services     HAN CASTILLO AH: Hadii caitlin carter Soera, waaxda luqadaha, qaybta kaalmada adeegyada, cliff nobles. So [facility] [phone].    ATENCIÓN: Si habla español, tiene a ibanez disposición servicios gratuitos de asistencia lingüística. " Leslie lopez [phone].    We comply with applicable federal civil rights laws and [place] laws. We do not discriminate on the basis of race, color, national origin, age, disability, sex, sexual orientation, or gender identity.            Thank you!     Thank you for choosing [facility]  for your care. Our goal is always to provide you with excellent care. Hearing back from our patients is one way we can continue to improve our services. Please take a few minutes to complete the written survey that you may receive in the mail after your visit with us. Thank you!             Your Updated Medication List - Protect others around you: Learn how to safely use, store and throw away your medicines at www.disposemymeds.org.          This list is accurate as of: 10/24/17 11:34 AM.  Always use your most recent med list.                   Brand Name Dispense Instructions for use Diagnosis    amLODIPine 10 MG tablet    NORVASC    90 tablet    Take 1 tablet (10 mg) by mouth daily    Essential hypertension with goal blood pressure less than 140/90       AQUAPHOR ADVANCED THERAPY Oint     30 g    Externally apply 1 dose topically 2 times daily    Adenocarcinoma of lung, left (H), Acneiform rash       ASPIRIN NOT PRESCRIBED    INTENTIONAL    0 each    1 each continuous prn Antiplatelet medication not prescribed intentionally due to oncology therapy.        atorvastatin 40 MG tablet    LIPITOR    90 tablet    TAKE ONE TABLET BY MOUTH ONE TIME DAILY    Hyperlipidemia with target LDL less than 100       blood glucose monitoring test strip    DEISY CONTOUR    200 strip    3 times daily.    Type 2 diabetes, HbA1c goal < 7% (H)       carvedilol 25 MG tablet    COREG    180 tablet    TAKE 1 TABLET (25 MG) BY MOUTH 2 TIMES DAILY (WITH MEALS)    Essential hypertension with goal blood pressure less than 140/90       clindamycin 1 % topical gel    CLINDAMAX    30 g    Use on face 2 times a day    Acne       clobetasol propionate  0.05 % Sham    CLOBEX    118 mL    Externally apply 1 Bottle topically daily Apply to scalp prior to showering.  Leave on for 15 min    Dry scalp, Adenocarcinoma of lung, unspecified laterality (H)       diphenoxylate-atropine 2.5-0.025 MG per tablet    LOMOTIL    40 tablet    Take 1 tablet by mouth 4 times daily as needed for diarrhea    Acute diarrhea       famotidine 40 MG tablet    PEPCID    90 tablet    TAKE 1 TABLET (40 MG) BY MOUTH AT BEDTIME    Dyspepsia and disorder of function of stomach       fluocinonide 0.05 % cream    LIDEX          glyBURIDE 5 MG tablet    DIABETA /MICRONASE          HYDROcodone-acetaminophen 5-325 MG per tablet    NORCO     Take 1 tablet by mouth every 4 hours as needed for moderate to severe pain        JENTADUETO 2.5-1000 MG per tablet   Generic drug:  linagliptin-metFORMIN     180 tablet    TAKE ONE TABLET BY MOUTH TWICE DAILY WITH FOOD    Type 2 diabetes mellitus with hyperglycemia, without long-term current use of insulin (H)       KLOR-CON 10 MEQ CR tablet   Generic drug:  potassium chloride SA     90 tablet    TAKE ONE TABLET BY MOUTH ONE TIME DAILY    Essential hypertension with goal blood pressure less than 140/90       lisinopril 40 MG tablet    PRINIVIL/ZESTRIL    60 tablet    TAKE ONE TABLET BY MOUTH TWICE DAILY    Essential hypertension with goal blood pressure less than 140/90       minocycline 100 MG tablet    DYNACIN    28 tablet    Take 1 tablet (100 mg) by mouth daily    Acneiform rash       mometasone 0.1 % cream    ELOCON    45 g    APPLY 1 GRAM TOPICALLY DAILY    Perioral dermatitis       MULTIVITAMIN PO      1 TABLET DAILY        nystatin cream    MYCOSTATIN          * order for DME     1 Units    Equipment being ordered: Home Glucometer    Type 2 diabetes, HbA1c goal < 7% (H)       * order for DME     400 strip    Equipment being ordered: Glucometer test strips for three times daily testing--fill with appropriate brand for the new glucometer    Type 2 diabetes,  HbA1c goal < 7% (H)       osimertinib 80 MG tablet    TAGRISSO     Take 80 mg by mouth daily        pioglitazone 30 MG tablet    ACTOS    90 tablet    TAKE ONE TABLET BY MOUTH ONE TIME DAILY    Type 2 diabetes mellitus with hyperglycemia, without long-term current use of insulin (H)       potassium chloride 10 MEQ tablet    K-TAB,KLOR-CON    90 tablet    TAKE ONE TABLET BY MOUTH ONE TIME DAILY    Essential hypertension with goal blood pressure less than 140/90       prochlorperazine 10 MG tablet    COMPAZINE          sulfacetamide 10 % ophthalmic solution    BLEPH-10     Apply 1 drop to eye every 2 hours (while awake)        tamsulosin 0.4 MG capsule    FLOMAX    90 capsule    TAKE ONE CAPSULE BY MOUTH ONE TIME DAILY    Enlarged prostate with lower urinary tract symptoms (LUTS)       * Notice:  This list has 2 medication(s) that are the same as other medications prescribed for you. Read the directions carefully, and ask your doctor or other care provider to review them with you.

## 2017-10-24 NOTE — TELEPHONE ENCOUNTER
Potassium  Prescription approved per [facility] Refill Protocol.  Tiara Bill, RN, BSN

## 2017-10-24 NOTE — NURSING NOTE
"Chief Complaint   Patient presents with     Diabetes     Hypertension     Panel Management     flu, dm eye exam       Initial /58  Pulse 80  Temp 98.3  F (36.8  C) (Temporal)  Resp 14  Ht 5' 6\" (1.676 m)  Wt 185 lb (83.9 kg)  SpO2 100%  BMI 29.86 kg/m2 Estimated body mass index is 29.86 kg/(m^2) as calculated from the following:    Height as of this encounter: 5' 6\" (1.676 m).    Weight as of this encounter: 185 lb (83.9 kg).  Medication Reconciliation: complete   April CURTIS Merino      "

## 2017-11-28 ENCOUNTER — TRANSFERRED RECORDS (OUTPATIENT)
Dept: HEALTH INFORMATION MANAGEMENT | Facility: CLINIC | Age: 62
End: 2017-11-28

## 2017-12-03 DIAGNOSIS — E78.5 HYPERLIPIDEMIA WITH TARGET LDL LESS THAN 100: ICD-10-CM

## 2017-12-05 RX ORDER — ATORVASTATIN CALCIUM 40 MG/1
TABLET, FILM COATED ORAL
Qty: 90 TABLET | Refills: 2 | Status: SHIPPED | OUTPATIENT
Start: 2017-12-05 | End: 2018-01-01

## 2017-12-05 NOTE — TELEPHONE ENCOUNTER
Requested Prescriptions   Pending Prescriptions Disp Refills     atorvastatin (LIPITOR) 40 MG tablet [Pharmacy Med Name: Atorvastatin Calcium Oral Tablet 40 MG] 90 tablet 0     Sig: TAKE ONE TABLET BY MOUTH ONE TIME DAILY    Statins Protocol Passed    12/4/2017 11:35 AM       Passed - LDL on file in past 12 months    Recent Labs   Lab Test  07/03/17   1209   LDL  43            Passed - No abnormal creatine kinase in past 12 months    No lab results found.         Passed - Recent or future visit with authorizing provider    Patient had office visit in the last year or has a visit in the next 30 days with authorizing provider.  See chart review.     Last OV: 10/24/17         Passed - Patient is age 18 or older        Prescription approved per [facility] Refill Protocol.    Rosa Romero RN, BSN

## 2017-12-29 ENCOUNTER — TELEPHONE (OUTPATIENT)
Dept: FAMILY MEDICINE | Facility: CLINIC | Age: 62
End: 2017-12-29

## 2017-12-29 DIAGNOSIS — I10 ESSENTIAL HYPERTENSION WITH GOAL BLOOD PRESSURE LESS THAN 140/90: ICD-10-CM

## 2017-12-29 RX ORDER — AMLODIPINE BESYLATE 10 MG/1
10 TABLET ORAL DAILY
Qty: 90 TABLET | Refills: 1 | Status: SHIPPED | OUTPATIENT
Start: 2017-12-29 | End: 2018-01-08

## 2017-12-29 NOTE — TELEPHONE ENCOUNTER
Reason for Call:  Medication or medication refill:    Do you use a [facility]?  Name of the pharmacy and phone number for the current request:  [facility] [place] [phone]    Name of the medication requested: amlodipine 10    Other request: will need refill asap per pharmacy.    Can we leave a detailed message on this number? YES    Phone number patient can be reached at: Home number on file [phone] (home)    Best Time: any    Call taken on 12/29/2017 at 3:13 PM by Katya Mehta

## 2017-12-29 NOTE — TELEPHONE ENCOUNTER
Reviewed the chart.  Amlodipine is only 10 mg daily.  Instruction should be left at 1 daily.  There are other antihypertensives which he takes twice daily and it may have been some confusion.    Javed Maier MD  Please close encounter when call/work completed.

## 2017-12-29 NOTE — TELEPHONE ENCOUNTER
Pharmacy calling. Direction for amlodipine state to take once daily but patient is stating he was instructed to take twice daily. Please clarify. Pharmacy would like a call back when PCP clarifies.

## 2018-01-01 ENCOUNTER — OFFICE VISIT (OUTPATIENT)
Dept: FAMILY MEDICINE | Facility: CLINIC | Age: 63
End: 2018-01-01
Payer: MEDICARE

## 2018-01-01 ENCOUNTER — TELEPHONE (OUTPATIENT)
Dept: FAMILY MEDICINE | Facility: CLINIC | Age: 63
End: 2018-01-01

## 2018-01-01 ENCOUNTER — OFFICE VISIT (OUTPATIENT)
Dept: ORTHOPEDICS | Facility: CLINIC | Age: 63
End: 2018-01-01
Payer: MEDICARE

## 2018-01-01 ENCOUNTER — OFFICE VISIT (OUTPATIENT)
Dept: OPTOMETRY | Facility: CLINIC | Age: 63
End: 2018-01-01
Payer: MEDICARE

## 2018-01-01 ENCOUNTER — TRANSFERRED RECORDS (OUTPATIENT)
Dept: HEALTH INFORMATION MANAGEMENT | Facility: CLINIC | Age: 63
End: 2018-01-01

## 2018-01-01 ENCOUNTER — TELEPHONE (OUTPATIENT)
Dept: ORTHOPEDICS | Facility: CLINIC | Age: 63
End: 2018-01-01

## 2018-01-01 ENCOUNTER — DOCUMENTATION ONLY (OUTPATIENT)
Dept: VASCULAR SURGERY | Facility: CLINIC | Age: 63
End: 2018-01-01

## 2018-01-01 ENCOUNTER — ANCILLARY PROCEDURE (OUTPATIENT)
Dept: GENERAL RADIOLOGY | Facility: CLINIC | Age: 63
End: 2018-01-01
Attending: PREVENTIVE MEDICINE
Payer: MEDICARE

## 2018-01-01 ENCOUNTER — PATIENT OUTREACH (OUTPATIENT)
Dept: GERIATRIC MEDICINE | Facility: CLINIC | Age: 63
End: 2018-01-01

## 2018-01-01 VITALS
SYSTOLIC BLOOD PRESSURE: 118 MMHG | TEMPERATURE: 97.9 F | RESPIRATION RATE: 16 BRPM | OXYGEN SATURATION: 98 % | HEART RATE: 70 BPM | DIASTOLIC BLOOD PRESSURE: 70 MMHG | BODY MASS INDEX: 30.29 KG/M2 | WEIGHT: 196.3 LBS

## 2018-01-01 VITALS
SYSTOLIC BLOOD PRESSURE: 128 MMHG | HEIGHT: 68 IN | DIASTOLIC BLOOD PRESSURE: 65 MMHG | WEIGHT: 195 LBS | RESPIRATION RATE: 20 BRPM | TEMPERATURE: 97.1 F | OXYGEN SATURATION: 100 % | BODY MASS INDEX: 29.55 KG/M2 | HEART RATE: 86 BPM

## 2018-01-01 VITALS — SYSTOLIC BLOOD PRESSURE: 132 MMHG | OXYGEN SATURATION: 94 % | HEART RATE: 86 BPM | DIASTOLIC BLOOD PRESSURE: 72 MMHG

## 2018-01-01 VITALS
HEART RATE: 90 BPM | RESPIRATION RATE: 14 BRPM | DIASTOLIC BLOOD PRESSURE: 60 MMHG | WEIGHT: 192 LBS | HEIGHT: 68 IN | TEMPERATURE: 98.6 F | BODY MASS INDEX: 29.1 KG/M2 | SYSTOLIC BLOOD PRESSURE: 110 MMHG

## 2018-01-01 VITALS
BODY MASS INDEX: 30.55 KG/M2 | OXYGEN SATURATION: 98 % | HEART RATE: 84 BPM | TEMPERATURE: 98.5 F | SYSTOLIC BLOOD PRESSURE: 106 MMHG | WEIGHT: 198 LBS | DIASTOLIC BLOOD PRESSURE: 48 MMHG

## 2018-01-01 VITALS
DIASTOLIC BLOOD PRESSURE: 59 MMHG | WEIGHT: 198 LBS | SYSTOLIC BLOOD PRESSURE: 113 MMHG | HEART RATE: 85 BPM | BODY MASS INDEX: 30.55 KG/M2

## 2018-01-01 VITALS
WEIGHT: 194 LBS | SYSTOLIC BLOOD PRESSURE: 124 MMHG | OXYGEN SATURATION: 98 % | BODY MASS INDEX: 29.94 KG/M2 | HEART RATE: 86 BPM | DIASTOLIC BLOOD PRESSURE: 66 MMHG | RESPIRATION RATE: 16 BRPM | TEMPERATURE: 97 F

## 2018-01-01 VITALS — HEART RATE: 134 BPM | DIASTOLIC BLOOD PRESSURE: 63 MMHG | SYSTOLIC BLOOD PRESSURE: 132 MMHG | OXYGEN SATURATION: 98 %

## 2018-01-01 DIAGNOSIS — M50.30 DEGENERATIVE DISC DISEASE, CERVICAL: Primary | ICD-10-CM

## 2018-01-01 DIAGNOSIS — N52.1 ERECTILE DYSFUNCTION DUE TO DISEASES CLASSIFIED ELSEWHERE: ICD-10-CM

## 2018-01-01 DIAGNOSIS — N40.1 BENIGN PROSTATIC HYPERPLASIA WITH NOCTURIA: ICD-10-CM

## 2018-01-01 DIAGNOSIS — I10 ESSENTIAL HYPERTENSION WITH GOAL BLOOD PRESSURE LESS THAN 140/90: ICD-10-CM

## 2018-01-01 DIAGNOSIS — Z23 NEED FOR PROPHYLACTIC VACCINATION AND INOCULATION AGAINST INFLUENZA: ICD-10-CM

## 2018-01-01 DIAGNOSIS — R97.20 ELEVATED PROSTATE SPECIFIC ANTIGEN (PSA): ICD-10-CM

## 2018-01-01 DIAGNOSIS — N40.1 ENLARGED PROSTATE WITH LOWER URINARY TRACT SYMPTOMS (LUTS): ICD-10-CM

## 2018-01-01 DIAGNOSIS — K31.9 DYSPEPSIA AND DISORDER OF FUNCTION OF STOMACH: ICD-10-CM

## 2018-01-01 DIAGNOSIS — E11.65 TYPE 2 DIABETES MELLITUS WITH HYPERGLYCEMIA, WITHOUT LONG-TERM CURRENT USE OF INSULIN (H): Primary | ICD-10-CM

## 2018-01-01 DIAGNOSIS — F40.240 CLAUSTROPHOBIA: ICD-10-CM

## 2018-01-01 DIAGNOSIS — E78.5 HYPERLIPIDEMIA WITH TARGET LDL LESS THAN 100: ICD-10-CM

## 2018-01-01 DIAGNOSIS — C34.92 ADENOCARCINOMA OF LEFT LUNG (H): ICD-10-CM

## 2018-01-01 DIAGNOSIS — Z13.6 SCREENING FOR AAA (ABDOMINAL AORTIC ANEURYSM): ICD-10-CM

## 2018-01-01 DIAGNOSIS — M54.12 CERVICAL RADICULAR PAIN: ICD-10-CM

## 2018-01-01 DIAGNOSIS — Z87.891 PERSONAL HISTORY OF TOBACCO USE: ICD-10-CM

## 2018-01-01 DIAGNOSIS — Z11.4 SCREENING FOR HIV (HUMAN IMMUNODEFICIENCY VIRUS): ICD-10-CM

## 2018-01-01 DIAGNOSIS — M54.12 CERVICAL RADICULAR PAIN: Primary | ICD-10-CM

## 2018-01-01 DIAGNOSIS — K21.9 GASTROESOPHAGEAL REFLUX DISEASE WITHOUT ESOPHAGITIS: ICD-10-CM

## 2018-01-01 DIAGNOSIS — R10.13 DYSPEPSIA AND DISORDER OF FUNCTION OF STOMACH: ICD-10-CM

## 2018-01-01 DIAGNOSIS — H11.153 PINGUECULA OF BOTH EYES: ICD-10-CM

## 2018-01-01 DIAGNOSIS — E11.9 TYPE 2 DIABETES, HBA1C GOAL < 7% (H): ICD-10-CM

## 2018-01-01 DIAGNOSIS — E11.65 TYPE 2 DIABETES MELLITUS WITH HYPERGLYCEMIA, WITHOUT LONG-TERM CURRENT USE OF INSULIN (H): ICD-10-CM

## 2018-01-01 DIAGNOSIS — R80.9 MICROALBUMINURIA: ICD-10-CM

## 2018-01-01 DIAGNOSIS — M54.9 UPPER BACK PAIN ON RIGHT SIDE: Primary | ICD-10-CM

## 2018-01-01 DIAGNOSIS — C34.32 MALIGNANT NEOPLASM OF LOWER LOBE OF LEFT LUNG (H): ICD-10-CM

## 2018-01-01 DIAGNOSIS — R35.1 BENIGN PROSTATIC HYPERPLASIA WITH NOCTURIA: ICD-10-CM

## 2018-01-01 DIAGNOSIS — M50.20 CERVICAL HERNIATED DISC: Primary | ICD-10-CM

## 2018-01-01 DIAGNOSIS — Z87.891 HISTORY OF SMOKING: ICD-10-CM

## 2018-01-01 DIAGNOSIS — M54.9 BACK PAIN, UNSPECIFIED BACK LOCATION, UNSPECIFIED BACK PAIN LATERALITY, UNSPECIFIED CHRONICITY: Primary | ICD-10-CM

## 2018-01-01 DIAGNOSIS — M50.20 CERVICAL HERNIATED DISC: ICD-10-CM

## 2018-01-01 DIAGNOSIS — H04.123 DRY EYES: Primary | ICD-10-CM

## 2018-01-01 LAB
ALBUMIN SERPL-MCNC: 3.7 G/DL (ref 3.4–5)
ALBUMIN SERPL-MCNC: 3.7 G/DL (ref 3.4–5)
ALP SERPL-CCNC: 64 U/L (ref 40–150)
ALP SERPL-CCNC: 72 U/L (ref 40–150)
ALT SERPL W P-5'-P-CCNC: 22 U/L (ref 0–70)
ALT SERPL W P-5'-P-CCNC: 25 U/L (ref 0–70)
ANION GAP SERPL CALCULATED.3IONS-SCNC: 5 MMOL/L (ref 3–14)
ANION GAP SERPL CALCULATED.3IONS-SCNC: 9 MMOL/L (ref 3–14)
AST SERPL W P-5'-P-CCNC: 10 U/L (ref 0–45)
AST SERPL W P-5'-P-CCNC: 13 U/L (ref 0–45)
BILIRUB SERPL-MCNC: 0.5 MG/DL (ref 0.2–1.3)
BILIRUB SERPL-MCNC: 0.6 MG/DL (ref 0.2–1.3)
BUN SERPL-MCNC: 21 MG/DL (ref 7–30)
BUN SERPL-MCNC: 23 MG/DL (ref 7–30)
CALCIUM SERPL-MCNC: 8.6 MG/DL (ref 8.5–10.1)
CALCIUM SERPL-MCNC: 8.8 MG/DL (ref 8.5–10.1)
CHLORIDE SERPL-SCNC: 104 MMOL/L (ref 94–109)
CHLORIDE SERPL-SCNC: 106 MMOL/L (ref 94–109)
CHOLEST SERPL-MCNC: 121 MG/DL
CO2 SERPL-SCNC: 27 MMOL/L (ref 20–32)
CO2 SERPL-SCNC: 31 MMOL/L (ref 20–32)
CREAT SERPL-MCNC: 1.16 MG/DL (ref 0.66–1.25)
CREAT SERPL-MCNC: 1.18 MG/DL (ref 0.66–1.25)
CREAT UR-MCNC: 185 MG/DL
CREAT UR-MCNC: 296 MG/DL
ERYTHROCYTE [DISTWIDTH] IN BLOOD BY AUTOMATED COUNT: 14.6 % (ref 10–15)
GFR SERPL CREATININE-BSD FRML MDRD: 62 ML/MIN/1.7M2
GFR SERPL CREATININE-BSD FRML MDRD: 63 ML/MIN/1.7M2
GLUCOSE SERPL-MCNC: 130 MG/DL (ref 70–99)
GLUCOSE SERPL-MCNC: 144 MG/DL (ref 70–99)
HBA1C MFR BLD: 5.6 % (ref 4.3–6)
HBA1C MFR BLD: 5.7 % (ref 0–5.6)
HCT VFR BLD AUTO: 42.7 % (ref 40–53)
HDLC SERPL-MCNC: 64 MG/DL
HGB BLD-MCNC: 13.5 G/DL (ref 13.3–17.7)
LDLC SERPL CALC-MCNC: 43 MG/DL
MCH RBC QN AUTO: 30.2 PG (ref 26.5–33)
MCHC RBC AUTO-ENTMCNC: 31.6 G/DL (ref 31.5–36.5)
MCV RBC AUTO: 96 FL (ref 78–100)
MICROALBUMIN UR-MCNC: 138 MG/L
MICROALBUMIN UR-MCNC: 35 MG/L
MICROALBUMIN/CREAT UR: 19.03 MG/G CR (ref 0–17)
MICROALBUMIN/CREAT UR: 46.62 MG/G CR (ref 0–17)
NONHDLC SERPL-MCNC: 57 MG/DL
PLATELET # BLD AUTO: 174 10E9/L (ref 150–450)
POTASSIUM SERPL-SCNC: 4.5 MMOL/L (ref 3.4–5.3)
POTASSIUM SERPL-SCNC: 5.1 MMOL/L (ref 3.4–5.3)
PROT SERPL-MCNC: 6.9 G/DL (ref 6.8–8.8)
PROT SERPL-MCNC: 7.2 G/DL (ref 6.8–8.8)
RBC # BLD AUTO: 4.47 10E12/L (ref 4.4–5.9)
SODIUM SERPL-SCNC: 140 MMOL/L (ref 133–144)
SODIUM SERPL-SCNC: 142 MMOL/L (ref 133–144)
T4 FREE SERPL-MCNC: 1.11 NG/DL (ref 0.76–1.46)
TRIGL SERPL-MCNC: 72 MG/DL
TSH SERPL DL<=0.005 MIU/L-ACNC: 3.08 MU/L (ref 0.4–4)
WBC # BLD AUTO: 6.7 10E9/L (ref 4–11)

## 2018-01-01 PROCEDURE — G0008 ADMIN INFLUENZA VIRUS VAC: HCPCS | Performed by: NURSE PRACTITIONER

## 2018-01-01 PROCEDURE — 83036 HEMOGLOBIN GLYCOSYLATED A1C: CPT | Performed by: FAMILY MEDICINE

## 2018-01-01 PROCEDURE — 36415 COLL VENOUS BLD VENIPUNCTURE: CPT | Performed by: FAMILY MEDICINE

## 2018-01-01 PROCEDURE — 99214 OFFICE O/P EST MOD 30 MIN: CPT | Mod: 25 | Performed by: NURSE PRACTITIONER

## 2018-01-01 PROCEDURE — 99214 OFFICE O/P EST MOD 30 MIN: CPT | Performed by: FAMILY MEDICINE

## 2018-01-01 PROCEDURE — G0296 VISIT TO DETERM LDCT ELIG: HCPCS | Performed by: NURSE PRACTITIONER

## 2018-01-01 PROCEDURE — 80053 COMPREHEN METABOLIC PANEL: CPT | Performed by: FAMILY MEDICINE

## 2018-01-01 PROCEDURE — 90662 IIV NO PRSV INCREASED AG IM: CPT | Performed by: NURSE PRACTITIONER

## 2018-01-01 PROCEDURE — 82043 UR ALBUMIN QUANTITATIVE: CPT | Performed by: FAMILY MEDICINE

## 2018-01-01 PROCEDURE — 72040 X-RAY EXAM NECK SPINE 2-3 VW: CPT | Performed by: RADIOLOGY

## 2018-01-01 PROCEDURE — 62321 NJX INTERLAMINAR CRV/THRC: CPT | Performed by: RADIOLOGY

## 2018-01-01 PROCEDURE — 99213 OFFICE O/P EST LOW 20 MIN: CPT | Performed by: PREVENTIVE MEDICINE

## 2018-01-01 PROCEDURE — 99203 OFFICE O/P NEW LOW 30 MIN: CPT | Performed by: OPTOMETRIST

## 2018-01-01 PROCEDURE — 84439 ASSAY OF FREE THYROXINE: CPT | Performed by: FAMILY MEDICINE

## 2018-01-01 PROCEDURE — 80061 LIPID PANEL: CPT | Performed by: FAMILY MEDICINE

## 2018-01-01 PROCEDURE — 84443 ASSAY THYROID STIM HORMONE: CPT | Performed by: FAMILY MEDICINE

## 2018-01-01 PROCEDURE — 99214 OFFICE O/P EST MOD 30 MIN: CPT | Performed by: PREVENTIVE MEDICINE

## 2018-01-01 PROCEDURE — 99213 OFFICE O/P EST LOW 20 MIN: CPT | Performed by: NURSE PRACTITIONER

## 2018-01-01 PROCEDURE — 85027 COMPLETE CBC AUTOMATED: CPT | Performed by: FAMILY MEDICINE

## 2018-01-01 RX ORDER — PIOGLITAZONEHYDROCHLORIDE 30 MG/1
TABLET ORAL
Qty: 90 TABLET | Refills: 0 | Status: SHIPPED | OUTPATIENT
Start: 2018-01-01 | End: 2019-01-01

## 2018-01-01 RX ORDER — METHYLPREDNISOLONE 4 MG
TABLET, DOSE PACK ORAL
Qty: 21 TABLET | Refills: 0 | Status: SHIPPED | OUTPATIENT
Start: 2018-01-01 | End: 2019-01-01

## 2018-01-01 RX ORDER — LORAZEPAM 1 MG/1
0.5-1 TABLET ORAL
Qty: 1 TABLET | Refills: 0 | Status: SHIPPED | OUTPATIENT
Start: 2018-01-01 | End: 2019-01-01

## 2018-01-01 RX ORDER — AMLODIPINE BESYLATE 10 MG/1
10 TABLET ORAL DAILY
Qty: 90 TABLET | Refills: 1 | Status: SHIPPED | OUTPATIENT
Start: 2018-01-01

## 2018-01-01 RX ORDER — LISINOPRIL 40 MG/1
TABLET ORAL
Qty: 180 TABLET | Refills: 0 | Status: SHIPPED | OUTPATIENT
Start: 2018-01-01 | End: 2018-01-01

## 2018-01-01 RX ORDER — CYCLOBENZAPRINE HCL 5 MG
5 TABLET ORAL DAILY PRN
Qty: 20 TABLET | Refills: 0 | Status: SHIPPED | OUTPATIENT
Start: 2018-01-01 | End: 2019-01-01

## 2018-01-01 RX ORDER — CARVEDILOL 25 MG/1
TABLET ORAL
Qty: 180 TABLET | Refills: 1 | Status: SHIPPED | OUTPATIENT
Start: 2018-01-01 | End: 2018-01-01

## 2018-01-01 RX ORDER — LINAGLIPTIN AND METFORMIN HYDROCHLORIDE 2.5; 1 MG/1; MG/1
TABLET, FILM COATED ORAL
Qty: 60 TABLET | Refills: 0 | Status: SHIPPED | OUTPATIENT
Start: 2018-01-01 | End: 2018-01-01

## 2018-01-01 RX ORDER — PIOGLITAZONEHYDROCHLORIDE 30 MG/1
30 TABLET ORAL DAILY
Qty: 90 TABLET | Refills: 1 | Status: SHIPPED | OUTPATIENT
Start: 2018-01-01 | End: 2018-01-01

## 2018-01-01 RX ORDER — GABAPENTIN 100 MG/1
100 CAPSULE ORAL 4 TIMES DAILY
Qty: 90 CAPSULE | Refills: 1 | Status: SHIPPED | OUTPATIENT
Start: 2018-01-01 | End: 2019-01-01

## 2018-01-01 RX ORDER — GABAPENTIN 300 MG/1
300 CAPSULE ORAL
Qty: 60 CAPSULE | Refills: 0 | Status: SHIPPED | OUTPATIENT
Start: 2018-01-01 | End: 2019-01-01 | Stop reason: DRUGHIGH

## 2018-01-01 RX ORDER — LIDOCAINE HYDROCHLORIDE 10 MG/ML
5 INJECTION, SOLUTION EPIDURAL; INFILTRATION; INTRACAUDAL; PERINEURAL ONCE
Status: COMPLETED | OUTPATIENT
Start: 2018-01-01 | End: 2018-01-01

## 2018-01-01 RX ORDER — PIOGLITAZONEHYDROCHLORIDE 30 MG/1
30 TABLET ORAL DAILY
Qty: 90 TABLET | Refills: 0 | OUTPATIENT
Start: 2018-01-01

## 2018-01-01 RX ORDER — AMLODIPINE BESYLATE 10 MG/1
10 TABLET ORAL DAILY
Qty: 90 TABLET | Refills: 1 | Status: SHIPPED | OUTPATIENT
Start: 2018-01-01 | End: 2018-01-01

## 2018-01-01 RX ORDER — LINAGLIPTIN AND METFORMIN HYDROCHLORIDE 2.5; 1 MG/1; MG/1
TABLET, FILM COATED ORAL
Qty: 180 TABLET | Refills: 0 | Status: SHIPPED | OUTPATIENT
Start: 2018-01-01 | End: 2018-01-01

## 2018-01-01 RX ORDER — BETAMETHASONE SODIUM PHOSPHATE AND BETAMETHASONE ACETATE 3; 3 MG/ML; MG/ML
6 INJECTION, SUSPENSION INTRA-ARTICULAR; INTRALESIONAL; INTRAMUSCULAR; SOFT TISSUE ONCE
Status: COMPLETED | OUTPATIENT
Start: 2018-01-01 | End: 2018-01-01

## 2018-01-01 RX ORDER — LISINOPRIL 40 MG/1
40 TABLET ORAL 2 TIMES DAILY
Qty: 180 TABLET | Refills: 1 | Status: SHIPPED | OUTPATIENT
Start: 2018-01-01

## 2018-01-01 RX ORDER — LINAGLIPTIN AND METFORMIN HYDROCHLORIDE 2.5; 1 MG/1; MG/1
TABLET, FILM COATED ORAL
Qty: 180 TABLET | Refills: 0 | OUTPATIENT
Start: 2018-01-01

## 2018-01-01 RX ORDER — ATORVASTATIN CALCIUM 40 MG/1
40 TABLET, FILM COATED ORAL DAILY
Qty: 90 TABLET | Refills: 3 | Status: SHIPPED | OUTPATIENT
Start: 2018-01-01

## 2018-01-01 RX ORDER — CARVEDILOL 25 MG/1
TABLET ORAL
Qty: 60 TABLET | Refills: 0 | Status: SHIPPED | OUTPATIENT
Start: 2018-01-01 | End: 2019-01-01

## 2018-01-01 RX ORDER — TAMSULOSIN HYDROCHLORIDE 0.4 MG/1
CAPSULE ORAL
Qty: 90 CAPSULE | Refills: 0 | Status: SHIPPED | OUTPATIENT
Start: 2018-01-01 | End: 2018-01-01

## 2018-01-01 RX ORDER — AMLODIPINE BESYLATE 10 MG/1
10 TABLET ORAL DAILY
Qty: 90 TABLET | Refills: 1
Start: 2018-01-01 | End: 2018-01-01

## 2018-01-01 RX ORDER — FAMOTIDINE 40 MG/1
TABLET, FILM COATED ORAL
Qty: 90 TABLET | Refills: 3 | Status: SHIPPED | OUTPATIENT
Start: 2018-01-01

## 2018-01-01 RX ORDER — POTASSIUM CHLORIDE 750 MG/1
TABLET, EXTENDED RELEASE ORAL
Qty: 90 TABLET | Refills: 3 | Status: SHIPPED | OUTPATIENT
Start: 2018-01-01

## 2018-01-01 RX ORDER — IOPAMIDOL 408 MG/ML
10 INJECTION, SOLUTION INTRATHECAL ONCE
Status: COMPLETED | OUTPATIENT
Start: 2018-01-01 | End: 2018-01-01

## 2018-01-01 RX ORDER — TRAMADOL HYDROCHLORIDE 50 MG/1
50 TABLET ORAL EVERY 6 HOURS PRN
Qty: 10 TABLET | Refills: 0 | Status: SHIPPED | OUTPATIENT
Start: 2018-01-01 | End: 2018-01-01

## 2018-01-01 RX ORDER — TRAMADOL HYDROCHLORIDE 50 MG/1
50-100 TABLET ORAL
Qty: 20 TABLET | Refills: 0 | Status: SHIPPED | OUTPATIENT
Start: 2018-01-01 | End: 2018-01-01

## 2018-01-01 RX ADMIN — LIDOCAINE HYDROCHLORIDE 5 ML: 10 INJECTION, SOLUTION EPIDURAL; INFILTRATION; INTRACAUDAL; PERINEURAL at 10:29

## 2018-01-01 RX ADMIN — IOPAMIDOL 7 ML: 408 INJECTION, SOLUTION INTRATHECAL at 10:29

## 2018-01-01 RX ADMIN — BETAMETHASONE SODIUM PHOSPHATE AND BETAMETHASONE ACETATE 18 MG: 3; 3 INJECTION, SUSPENSION INTRA-ARTICULAR; INTRALESIONAL; INTRAMUSCULAR; SOFT TISSUE at 10:28

## 2018-01-01 ASSESSMENT — TONOMETRY
OS_IOP_MMHG: 12
IOP_METHOD: APPLANATION
OD_IOP_MMHG: 12

## 2018-01-01 ASSESSMENT — VISUAL ACUITY
OS_SC: 20/80
OS_SC+: -2
OD_SC+: -2
OS_PH_SC: 20/25-2
METHOD: SNELLEN - LINEAR
OD_SC: 20/20

## 2018-01-01 ASSESSMENT — SLIT LAMP EXAM - LIDS: COMMENTS: NORMAL

## 2018-01-01 ASSESSMENT — PAIN SCALES - GENERAL
PAINLEVEL: SEVERE PAIN (7)
PAINLEVEL: NO PAIN (0)
PAINLEVEL: MODERATE PAIN (5)

## 2018-01-03 RX ORDER — LINAGLIPTIN AND METFORMIN HYDROCHLORIDE 2.5; 1 MG/1; MG/1
TABLET, FILM COATED ORAL
Qty: 180 TABLET | Refills: 0 | Status: SHIPPED | OUTPATIENT
Start: 2018-01-03 | End: 2018-01-01

## 2018-01-03 NOTE — TELEPHONE ENCOUNTER
Requested Prescriptions   Pending Prescriptions Disp Refills     JENTADUETO 2.5-1000 MG per tablet [Pharmacy Med Name: Jentadueto Oral Tablet 2.5-1000 MG] 180 tablet 0     Sig: TAKE ONE TABLET BY MOUTH TWICE DAILY with food    Combination Oral Antihyperglycemic Agents Passed    1/1/2018  4:45 PM       Passed - Patient's BP is less than 140/90    BP Readings from Last 3 Encounters:   10/24/17 114/58   07/03/17 130/56   11/21/16 118/60            Passed - Patient has a documented LDL level within past 12 mos.    Recent Labs   Lab Test  07/03/17   1209   LDL  43            Passed - Patient has a documented Microalbumin level within past 12 mos.    Recent Labs   Lab Test  10/24/17   1142   MICROL  57   UMALCR  45.97*            Passed - Patient has documented A1c within the specified period of time.    Recent Labs   Lab Test  10/24/17   1134   A1C  5.5            Passed - Patient's CR is NOT>1.4 OR Patient's EGFR is NOT<45 within past 12 mos.    Recent Labs   Lab Test  10/24/17   1134   GFRESTIMATED  69   GFRESTBLACK  84       Recent Labs   Lab Test  10/24/17   1134   CR  1.07            Passed - Patient does not have a diagnosis of CHF.       Passed - Patient is 18 years old or older.       Passed - Patient has had appt within past 6 mos    Patient had office visit in the last 6 months or has a visit in the next 30 days with authorizing provider.  See chart review.             JENTADUETO 2.5-1000 MG per tablet  Prescription approved per [facility] Refill Protocol.    Per [facility] 10/24/2017 to follow up in 4 months. Labs were known at LOV.    Please assist with scheduling around 02/2018.    Frannie Hutchins, RN, BSN

## 2018-01-08 ENCOUNTER — TELEPHONE (OUTPATIENT)
Dept: FAMILY MEDICINE | Facility: CLINIC | Age: 63
End: 2018-01-08

## 2018-01-08 DIAGNOSIS — I10 ESSENTIAL HYPERTENSION WITH GOAL BLOOD PRESSURE LESS THAN 140/90: ICD-10-CM

## 2018-01-08 NOTE — TELEPHONE ENCOUNTER
Reason for call:  Patient reporting a symptom    Symptom or request: Blood pressure issues, he is on medication for this and he is still having high readings. Last night it was 156/85 he took his medications that evening and this morning and this morning it was  160/80. Now it is 164/80.     Duration (how long have symptoms been present): ongoing two days    Have you been treated for this before? No    Additional comments: none    Phone Number patient can be reached at:  Home number on file [phone] (home)    Best Time:  any    Can we leave a detailed message on this number:  YES    Call taken on 1/8/2018 at 4:14 PM by Yomaira Cazares

## 2018-01-08 NOTE — TELEPHONE ENCOUNTER
I spoke with pt who states he decreased his Amlodipine from BID to daily two days ago and since then he has notice an increase in his B/P readings. 156/85 160/80 164/80. Huddled with Dr. Maier and he would like pt to go back to BID dosing since he tolerated it before.  Pt will continue to check blood pressures at home and call us back if they do not improve.    Routed to DA to update prescription.     Salma Hurtado, RN, BSN

## 2018-01-09 RX ORDER — AMLODIPINE BESYLATE 10 MG/1
10 TABLET ORAL
Qty: 180 TABLET | Refills: 1 | Status: SHIPPED | OUTPATIENT
Start: 2018-01-09 | End: 2018-01-01

## 2018-01-31 DIAGNOSIS — E11.65 TYPE 2 DIABETES MELLITUS WITH HYPERGLYCEMIA, WITHOUT LONG-TERM CURRENT USE OF INSULIN (H): ICD-10-CM

## 2018-02-01 RX ORDER — PIOGLITAZONEHYDROCHLORIDE 30 MG/1
TABLET ORAL
Qty: 30 TABLET | Refills: 0 | Status: SHIPPED | OUTPATIENT
Start: 2018-02-01 | End: 2018-01-01

## 2018-02-02 NOTE — TELEPHONE ENCOUNTER
"Requested Prescriptions   Pending Prescriptions Disp Refills     pioglitazone (ACTOS) 30 MG tablet [Pharmacy Med Name: Pioglitazone HCl Oral Tablet 30 MG] 90 tablet 0     Sig: TAKE ONE TABLET BY MOUTH ONE TIME DAILY    Thiazolidinedione Agents (TZD's)  Passed    1/31/2018  1:28 PM       Passed - Patient's BP is less than 140/90    BP Readings from Last 3 Encounters:   10/24/17 114/58   07/03/17 130/56   11/21/16 118/60                Passed - Patient has documented LDL within the past 12 mos.    Recent Labs   Lab Test  07/03/17   1209   LDL  43            Passed - Patient has a normal ALT within the past 12 mos.    Recent Labs   Lab Test  07/03/17   1209   ALT  23            Passed - Patient has a normal AST within the past 12 mos.     Recent Labs   Lab Test  07/03/17   1209   AST  12            Passed - Patient has had a Microalbumin in the past 12 mos.    Recent Labs   Lab Test  10/24/17   1142   MICROL  57   UMALCR  45.97*            Passed - Patient has documented A1c within the specified period of time.    Recent Labs   Lab Test  10/24/17   1134   A1C  5.5            Passed - Diagnosis not CHF       Passed - Patient is age 18 or older       Passed - Patient has a normal serum Creatinine in the past 12 months    Recent Labs   Lab Test  10/24/17   1134   CR  1.07            Passed - Patient has had an appointment with authorizing provider within the past 6 mos.or within the next 30 days.    Patient had office visit in the last 6 months or has a visit in the next 30 days with authorizing provider.  See \"Patient Info\" tab in inbasket, or \"Choose Columns\" in Meds & Orders section of the refill encounter.            Prescription approved per [facility] Refill Protocol.    Pt is due for a f/u this month per last OV note on 10/24/17: f/u in 4 months.  Please call pt and help him schedule an appt.    Lisset Barron RN      "

## 2018-02-12 ENCOUNTER — TRANSFERRED RECORDS (OUTPATIENT)
Dept: HEALTH INFORMATION MANAGEMENT | Facility: CLINIC | Age: 63
End: 2018-02-12

## 2018-02-16 ENCOUNTER — CARE COORDINATION (OUTPATIENT)
Dept: GERIATRIC MEDICINE | Facility: CLINIC | Age: 63
End: 2018-02-16

## 2018-02-16 NOTE — PROGRESS NOTES
"Per CC, mailed client a \"Refusal of Home Visit\" letter.  MMIS entry.    Janeth Echeverria  Case Management Specialist  [facility]  [phone]  .   "

## 2018-02-16 NOTE — PROGRESS NOTES
"[facility] Refusal Telephone Assessment    Member refused home visit HRA on 2/16/18 (reason: \"I just had a visit on 1/1818 and don't want another one.  I don't want anyone asking those personal questions again.  ).    ER visits: No  Hospitalizations: No  Health concerns: no  Falls/Injuries: No  ADL/IADL Dependencies: None   Member currently receiving the following services: None    Informal support(s): none    Advanced Care Planning discussion, complete code section.    [facility] Plan sponsored benefits: Shared information re: Silver Sneakers/gym memberships, ASA, Calcium +D.    Follow-Up Plan: Member informed of future contact, plan to f/u with member with a 6 month telephone assessment and offer a home visit.  Contact information shared with member and family, encouraged member to call with any questions or concerns at any time.    Requested CMS to mail refusal letter.    Kat Parham RN  [facility]   [phone]    "

## 2018-03-09 NOTE — PROGRESS NOTES
"  SUBJECTIVE:   Tolu Villeda is a 63 year old male who presents to clinic today for the following health issues:      HPI   Patient would like refills.     Patient wanted to discuss about elevated heart rate, been noticeable  more lately.   Diabetes Follow-up      Patient is checking blood sugars: \"couple times a day, average once and it usually at night I checked it\". The  range 115-125    Diabetic concerns: None     Symptoms of hypoglycemia (low blood sugar): none     Paresthesias (numbness or burning in feet) or sores: Yes -\"sometime my feet tingling and warm, especially sitting in the car my right leg go numb. I think is diabetes related or circulations.\"      Date of last diabetic eye exam: couple normal ago, Pt saw [facility] reported normal.     BP Readings from Last 2 Encounters:   10/24/17 114/58   07/03/17 130/56     Hemoglobin A1C (%)   Date Value   10/24/2017 5.5   07/03/2017 5.7     LDL Cholesterol Calculated (mg/dL)   Date Value   07/03/2017 43   11/21/2016 51     Hyperlipidemia Follow-Up      Rate your low fat/cholesterol diet?: good    Taking statin?  Yes, no muscle aches from statin    Other lipid medications/supplements?:  none    Hypertension Follow-up      Outpatient blood pressures are being checked at home.  Results are \"every morning and every night.\" [id number].    Low Salt Diet: no added salt    Problem list and histories reviewed & adjusted, as indicated.  Additional history: as documented            ROS:  CONSTITUTIONAL: NEGATIVE for fever, chills, change in weight  CONSTITUTIONAL: Maintaining weight with current chemotherapy agents.  INTEGUMENTARY/SKIN: NEGATIVE for worrisome rashes, moles or lesions  INTEGUMENTARY/SKIN: No apparent new dermatologic reactions to chemotherapy.  EYES: NEGATIVE for vision changes or irritation  ENT/MOUTH: NEGATIVE for ear, mouth and throat problems  RESP: NEGATIVE for significant cough or SOB  RESP: Continues on maintenance therapy for " "adenocarcinoma of the left lung followed by his oncologist.  BREAST: NEGATIVE for masses, tenderness or discharge  CV: NEGATIVE for chest pain, palpitations or peripheral edema  CV: Hypertension remains well controlled.  GI: NEGATIVE for nausea, abdominal pain, heartburn, or change in bowel habits  : NEGATIVE for frequency, dysuria, or hematuria   male : Number of years ago was noted to have a mild elevation of PSA.  His oncologist feels that is not worth pursuing due to his underlying lung cancer.  Patient wishes to not have any additional testing done.  Level was 4.41 approximately 3 years ago.  MUSCULOSKELETAL: NEGATIVE for significant arthralgias or myalgia  NEURO: NEGATIVE for weakness, dizziness or paresthesias  NEURO: No new neurologic changes from chemotherapy.  ENDOCRINE: NEGATIVE for temperature intolerance, skin/hair changes  HEME: NEGATIVE for bleeding problems  PSYCHIATRIC: NEGATIVE for changes in mood or affect    OBJECTIVE:                                                    /60  Pulse 90  Temp 98.6  F (37  C) (Temporal)  Resp 14  Ht 5' 7.5\" (1.715 m)  Wt 192 lb (87.1 kg)  BMI 29.63 kg/m2  Body mass index is 29.63 kg/(m^2).  GENERAL: alert, no distress and cooperative  EYES: Eyes grossly normal to inspection, extraocular movements - intact, and PERRL  HENT: ear canals- normal; TMs- normal; Nose- normal; Mouth- no ulcers, no lesions  NECK: no tenderness, no adenopathy, no asymmetry, no masses, no stiffness; thyroid- normal to palpation  RESP: lungs clear to auscultation - no rales, no rhonchi, no wheezes  RESP: decreased breath sounds at the left base posteriorly but otherwise clear.  BREAST: no masses, no tenderness, no nipple discharge, no palpable axillary masses or adenopathy  CV: regular rates and rhythm, normal S1 S2, no S3 or S4 and no murmur, no click or rub -  ABDOMEN: soft, no tenderness, no  hepatosplenomegaly, no masses, normal bowel sounds  MS: extremities- no gross " deformities noted, no edema  SKIN: no suspicious lesions, no rashes  NEURO: strength and tone- normal, sensory exam- grossly normal, mentation- intact, speech- normal, reflexes- symmetric  Diabetic foot exam: normal DP and PT pulses, no trophic changes or ulcerative lesions and normal sensory exam  BACK: no CVA tenderness, no paralumbar tenderness  - male: testicles- normal, no atrophy, no masses;  no inguinal hernias  PSYCH: Alert and oriented times 3; speech- coherent , normal rate and volume; able to articulate logical thoughts, able to abstract reason, no tangential thoughts, no hallucinations or delusions, affect- normal  LYMPHATICS: ant. cervical- normal, post. cervical- normal, axillary- normal, supraclavicular- normal, inguinal- normal    Diagnostic test results:  Diagnostic Test Results:  Results for orders placed or performed in visit on 03/12/18 (from the past 24 hour(s))   Hemoglobin A1c   Result Value Ref Range    Hemoglobin A1C 5.6 4.3 - 6.0 %   CBC with platelets   Result Value Ref Range    WBC 6.7 4.0 - 11.0 10e9/L    RBC Count 4.47 4.4 - 5.9 10e12/L    Hemoglobin 13.5 13.3 - 17.7 g/dL    Hematocrit 42.7 40.0 - 53.0 %    MCV 96 78 - 100 fl    MCH 30.2 26.5 - 33.0 pg    MCHC 31.6 31.5 - 36.5 g/dL    RDW 14.6 10.0 - 15.0 %    Platelet Count 174 150 - 450 10e9/L        ASSESSMENT/PLAN:                                                    1. Type 2 diabetes mellitus with hyperglycemia, without long-term current use of insulin (H)  A1c well within normal limits.  Continue current combination therapy and diet.  - pioglitazone (ACTOS) 30 MG tablet; Take 1 tablet (30 mg) by mouth daily  Dispense: 90 tablet; Refill: 1  - ASPIRIN NOT PRESCRIBED (INTENTIONAL); 1 each continuous prn Antiplatelet medication not prescribed intentionally due to oncology therapy.  Dispense: 0 each; Refill: 0  - Hemoglobin A1c  - Comprehensive metabolic panel  - CBC with platelets  - Lipid panel reflex to direct LDL Fasting    2.  Essential hypertension with goal blood pressure less than 140/90  Blood pressure remains adequately controlled on current medications  - Comprehensive metabolic panel  - Lipid panel reflex to direct LDL Fasting  - amLODIPine (NORVASC) 10 MG tablet; Take 1 tablet (10 mg) by mouth daily  Dispense: 90 tablet; Refill: 1    3. Hyperlipidemia with target LDL less than 100  Lipid profile pending but likely well-controlled.  - Comprehensive metabolic panel  - Lipid panel reflex to direct LDL Fasting  - TSH  - T4, free    4. Adenocarcinoma of left lung (H)  Followed by his oncologist and by report his stable with maintenance therapy.    5. Gastroesophageal reflux disease without esophagitis  Stable on current medications    6. Elevated prostate specific antigen (PSA)  Patient is well aware that this easily could be prostate cancer.  The growth of it is likely slow in his lung cancer likely would be the fatal lobe versus prostate cancer.  He wishes not to recheck.    7. Microalbuminuria  Checking levels  - Albumin Random Urine Quantitative with Creat Ratio    8. Screening for AAA (abdominal aortic aneurysm)  Candidate for screening for AAA and this was ordered.  - Abdominal Aortic Aneurysm Screening/Tracking  -  [employer] Medicare AAA Screening; Future    9. History of smoking  Past history of smoking.  - Abdominal Aortic Aneurysm Screening/Tracking  -  [employer] Medicare AAA Screening; Future      Follow up with Provider -Follow-up in 4 months with fasting blood studies.  See Patient Instructions    The patient understood the rational for the diagnosis and treatment plan. All questions were answered to best of my ability and the patient's satisfaction.    Note: Chart documentation done in part with Dragon Voice Recognition software. Although reviewed after completion, some word and grammatical errors may remain.  Please consider this when interpreting information in this chart.    Javed Maier MD  [facility]

## 2018-03-12 NOTE — MR AVS SNAPSHOT
After Visit Summary   3/12/2018    Tolu Villeda    MRN: [id number]           Patient Information     Date Of Birth          1955        Visit Information        Provider Department      3/12/2018 11:20 AM Javed Maier MD [facility]        Today's Diagnoses     Type 2 diabetes mellitus with hyperglycemia, without long-term current use of insulin (H)    -  1    Essential hypertension with goal blood pressure less than 140/90        Hyperlipidemia with target LDL less than 100        Adenocarcinoma of left lung (H)        Gastroesophageal reflux disease without esophagitis        Elevated prostate specific antigen (PSA)        Microalbuminuria        Screening for AAA (abdominal aortic aneurysm)        History of smoking          Care Instructions    These are new changes to your current plan of care based on today's visit:    Medications stopped    Medications to be started    Change dose of this medication None   New treatments        Follow up appointments:    1.  FOLLOW UP WITH YOUR PRIMARY CARE PROVIDER: 4 month(s) for diabetic follow up with fasting blood work    2. FOLLOW UP WITH SPECIALIST: As planned    3. FOLLOW UP WITH NURSE VISIT:     4. IMAGING STUDIES TO BE SCHEDULED: Abdominal aorta ultrasound      Javed Maier MD                    Follow-ups after your visit        Follow-up notes from your care team     Return in about 4 months (around 7/12/2018) for Lab Work, Routine Visit.      Future tests that were ordered for you today     Open Future Orders        Priority Expected Expires Ordered     Aorta Medicare AAA Screening Routine  3/12/2019 3/12/2018            Who to contact     If you have questions or need follow up information about today's clinic visit or your schedule please contact [facility] directly at [phone].  Normal or non-critical lab and imaging results will be communicated to you by MyChart, letter or phone within 4  "business days after the clinic has received the results. If you do not hear from us within 7 days, please contact the clinic through [employer] or phone. If you have a critical or abnormal lab result, we will notify you by phone as soon as possible.  Submit refill requests through [employer] or call your pharmacy and they will forward the refill request to us. Please allow 3 business days for your refill to be completed.          Additional Information About Your Visit        [employer] Information     [employer] gives you secure access to your electronic health record. If you see a primary care provider, you can also send messages to your care team and make appointments. If you have questions, please call your primary care clinic.  If you do not have a primary care provider, please call [phone] and they will assist you.        Care EveryWhere ID     This is your Care EveryWhere ID. This could be used by other organizations to access your [facility] medical records  [id number]        Your Vitals Were     Pulse Temperature Respirations Height BMI (Body Mass Index)       90 98.6  F (37  C) (Temporal) 14 5' 7.5\" (1.715 m) 29.63 kg/m2        Blood Pressure from Last 3 Encounters:   03/12/18 110/60   10/24/17 114/58   07/03/17 130/56    Weight from Last 3 Encounters:   03/12/18 192 lb (87.1 kg)   10/24/17 185 lb (83.9 kg)   07/03/17 180 lb (81.6 kg)              We Performed the Following     Abdominal Aortic Aneurysm Screening/Tracking     Albumin Random Urine Quantitative with Creat Ratio     CBC with platelets     Comprehensive metabolic panel     Hemoglobin A1c     Lipid panel reflex to direct LDL Fasting     T4, free     TSH          Today's Medication Changes          These changes are accurate as of 3/12/18 12:15 PM.  If you have any questions, ask your nurse or doctor.               Start taking these medicines.        Dose/Directions    amLODIPine 10 MG tablet   Commonly known as:  NORVASC   Used for:  Essential " hypertension with goal blood pressure less than 140/90   Started by:  Javed Maier MD        Dose:  10 mg   Take 1 tablet (10 mg) by mouth daily   Quantity:  90 tablet   Refills:  1         These medicines have changed or have updated prescriptions.        Dose/Directions    pioglitazone 30 MG tablet   Commonly known as:  ACTOS   This may have changed:  See the new instructions.   Used for:  Type 2 diabetes mellitus with hyperglycemia, without long-term current use of insulin (H)   Changed by:  Javed Maier MD        Dose:  30 mg   Take 1 tablet (30 mg) by mouth daily   Quantity:  90 tablet   Refills:  1         Stop taking these medicines if you haven't already. Please contact your care team if you have questions.     KLOR-CON 10 MEQ CR tablet   Generic drug:  potassium chloride SA   Stopped by:  Javed Maier MD                Where to get your medicines      These medications were sent to [id number] [place], [place] - [address]  [address] [facility] [address]     Phone:  [phone]     amLODIPine 10 MG tablet    pioglitazone 30 MG tablet         Some of these will need a paper prescription and others can be bought over the counter.  Ask your nurse if you have questions.     You don't need a prescription for these medications     ASPIRIN NOT PRESCRIBED                Primary Care Provider Office Phone # Fax #    Javed Maier [phone] [address] [facility] [address]        Equal Access to Services     [facility] AH: Hadii aad ku hadasho Soomaali, waaxda luqadaha, qaybta kaalmada adeegyada, waxay idiin hayaan adedelia kharacarlos shay'shea ah. So [facility] [phone].    ATENCIÓN: Si habla español, tiene a ibanez disposición servicios gratuitos de asistencia lingüística. Llame al [phone].    We comply with applicable federal civil rights laws and [place] laws. We do not discriminate on the basis of race, color, national origin, age,  disability, sex, sexual orientation, or gender identity.            Thank you!     Thank you for choosing [facility]  for your care. Our goal is always to provide you with excellent care. Hearing back from our patients is one way we can continue to improve our services. Please take a few minutes to complete the written survey that you may receive in the mail after your visit with us. Thank you!             Your Updated Medication List - Protect others around you: Learn how to safely use, store and throw away your medicines at www.disposemymeds.org.          This list is accurate as of 3/12/18 12:15 PM.  Always use your most recent med list.                   Brand Name Dispense Instructions for use Diagnosis    amLODIPine 10 MG tablet    NORVASC    90 tablet    Take 1 tablet (10 mg) by mouth daily    Essential hypertension with goal blood pressure less than 140/90       AQUAPHOR ADVANCED THERAPY Oint     30 g    Externally apply 1 dose topically 2 times daily    Adenocarcinoma of lung, left (H), Acneiform rash       ASPIRIN NOT PRESCRIBED    INTENTIONAL    0 each    1 each continuous prn Antiplatelet medication not prescribed intentionally due to oncology therapy.    Type 2 diabetes mellitus with hyperglycemia, without long-term current use of insulin (H)       atorvastatin 40 MG tablet    LIPITOR    90 tablet    TAKE ONE TABLET BY MOUTH ONE TIME DAILY    Hyperlipidemia with target LDL less than 100       blood glucose monitoring test strip    DEISY CONTOUR    200 strip    3 times daily.    Type 2 diabetes, HbA1c goal < 7% (H)       carvedilol 25 MG tablet    COREG    180 tablet    TAKE 1 TABLET (25 MG) BY MOUTH 2 TIMES DAILY (WITH MEALS)    Essential hypertension with goal blood pressure less than 140/90       clindamycin 1 % topical gel    CLINDAMAX    30 g    Use on face 2 times a day    Acne       clobetasol propionate 0.05 % Sham    CLOBEX    118 mL    Externally apply 1 Bottle topically daily  Apply to scalp prior to showering.  Leave on for 15 min    Dry scalp, Adenocarcinoma of lung, unspecified laterality (H)       diphenoxylate-atropine 2.5-0.025 MG per tablet    LOMOTIL    40 tablet    Take 1 tablet by mouth 4 times daily as needed for diarrhea    Acute diarrhea       famotidine 40 MG tablet    PEPCID    90 tablet    TAKE 1 TABLET (40 MG) BY MOUTH AT BEDTIME    Dyspepsia and disorder of function of stomach       fluocinonide 0.05 % cream    LIDEX          glyBURIDE 5 MG tablet    DIABETA /MICRONASE          HYDROcodone-acetaminophen 5-325 MG per tablet    NORCO     Take 1 tablet by mouth every 4 hours as needed for moderate to severe pain        JENTADUETO 2.5-1000 MG per tablet   Generic drug:  linagliptin-metFORMIN     180 tablet    TAKE ONE TABLET BY MOUTH TWICE DAILY with food    Type 2 diabetes mellitus with hyperglycemia, without long-term current use of insulin (H)       lisinopril 40 MG tablet    PRINIVIL/ZESTRIL    60 tablet    TAKE ONE TABLET BY MOUTH TWICE DAILY    Essential hypertension with goal blood pressure less than 140/90       minocycline 100 MG tablet    DYNACIN    28 tablet    Take 1 tablet (100 mg) by mouth daily    Acneiform rash       mometasone 0.1 % cream    ELOCON    45 g    APPLY 1 GRAM TOPICALLY DAILY    Perioral dermatitis       MULTIVITAMIN PO      1 TABLET DAILY        nystatin cream    MYCOSTATIN          * order for DME     1 Units    Equipment being ordered: Home Glucometer    Type 2 diabetes, HbA1c goal < 7% (H)       * order for DME     400 strip    Equipment being ordered: Glucometer test strips for three times daily testing--fill with appropriate brand for the new glucometer    Type 2 diabetes, HbA1c goal < 7% (H)       osimertinib 80 MG tablet    TAGRISSO     Take 80 mg by mouth daily        pioglitazone 30 MG tablet    ACTOS    90 tablet    Take 1 tablet (30 mg) by mouth daily    Type 2 diabetes mellitus with hyperglycemia, without long-term current use of  insulin (H)       potassium chloride 10 MEQ tablet    K-TAB,KLOR-CON    90 tablet    TAKE ONE TABLET BY MOUTH ONE TIME DAILY    Essential hypertension with goal blood pressure less than 140/90       prochlorperazine 10 MG tablet    COMPAZINE          sulfacetamide 10 % ophthalmic solution    BLEPH-10     Apply 1 drop to eye every 2 hours (while awake)        tamsulosin 0.4 MG capsule    FLOMAX    90 capsule    TAKE ONE CAPSULE BY MOUTH ONE TIME DAILY    Enlarged prostate with lower urinary tract symptoms (LUTS)       * Notice:  This list has 2 medication(s) that are the same as other medications prescribed for you. Read the directions carefully, and ask your doctor or other care provider to review them with you.

## 2018-03-12 NOTE — PATIENT INSTRUCTIONS
These are new changes to your current plan of care based on today's visit:    Medications stopped    Medications to be started    Change dose of this medication None   New treatments        Follow up appointments:    1.  FOLLOW UP WITH YOUR PRIMARY CARE PROVIDER: 4 month(s) for diabetic follow up with fasting blood work    2. FOLLOW UP WITH SPECIALIST: As planned    3. FOLLOW UP WITH NURSE VISIT:     4. IMAGING STUDIES TO BE SCHEDULED: Abdominal aorta ultrasound      Javed Maier MD

## 2018-03-26 NOTE — PROGRESS NOTES
"Chief Complaint   Patient presents with     Eye Problem Both Eyes     Right eye seems worse      HPI    Last Eye Exam:  11/28/17   Additional Referring Providers:      Informant(s):      Affected eye(s):  Both   Symptoms:     Blurred vision   Redness   Foreign body sensation   Tearing   Burning   Eye discharge      Duration:  3 days   Frequency:  Constant       Do you have eye pain now?:  No      Comments:  Patient stated when he woke up 3 days ago his right eye was very red. He said that it looked like he got punched. It was most red in the nasal corner of his eye. He said that he has also has some issues with \"sleep\" in his eyes, crystal consistancy, which he usually doesn't have.   He also stated that he takes a medication, Tagrisso that claims one of the side effects is red or blood shot eyes.   Today both eyes seem to be red, not painful, just more bothersome. He did say that over the first few days right eye was a little painful to the touch, mild sting , and it also felt like his eye was \"cool inside\" felt like breeze blowing  in right eye, foreign body sensation like aneyelash in right eye  Left eye no matter , but red today    Used artificial tears in past- helped comfort and decreased redness , not used recently                  HPI  performed by Claudia Ferguson, OD  scribed by Tesha Altman Optometric Assistant       Review of Symptoms    EYES: See HPI  CONSTITUTIONAL: patient reports feeling healthy      Medical, surgical and family histories reviewed and updated 3/26/2018.         OBJECTIVE: See Ophthalmology exam    ASSESSMENT:    ICD-10-CM    1. Dry eyes H04.123 polyethylene glycol 0.4%- propylene glycol 0.3% (SYSTANE ULTRA) 0.4-0.3 % SOLN ophthalmic solution   2. Pinguecula of both eyes H11.153 polyethylene glycol 0.4%- propylene glycol 0.3% (SYSTANE ULTRA) 0.4-0.3 % SOLN ophthalmic solution      PLAN:    Patient Instructions   Patient was advised of today's exam findings.  Use over the counter artificial " tears 3 times a day (Thera Tears, Systane Ultra or Refresh Optive)  Return to clinic as needed     Claudia Ferguson O.D.  [facility]   [address] [place] [address] [phone]

## 2018-03-26 NOTE — LETTER
"    3/26/2018         RE: Tolu Villeda  [address] [facility] [address]        Dear Colleague,    Thank you for referring your patient, Tolu Villeda, to the [facility]. Please see a copy of my visit note below.    Chief Complaint   Patient presents with     Eye Problem Both Eyes     Right eye seems worse      HPI    Last Eye Exam:  11/28/17   Additional Referring Providers:      Informant(s):      Affected eye(s):  Both   Symptoms:     Blurred vision   Redness   Foreign body sensation   Tearing   Burning   Eye discharge      Duration:  3 days   Frequency:  Constant       Do you have eye pain now?:  No      Comments:  Patient stated when he woke up 3 days ago his right eye was very red. He said that it looked like he got punched. It was most red in the nasal corner of his eye. He said that he has also has some issues with \"sleep\" in his eyes, crystal consistancy, which he usually doesn't have.   He also stated that he takes a medication, Tagrisso that claims one of the side effects is red or blood shot eyes.   Today both eyes seem to be red, not painful, just more bothersome. He did say that over the first few days right eye was a little painful to the touch, mild sting , and it also felt like his eye was \"cool inside\" felt like breeze blowing  in right eye, foreign body sensation like aneyelash in right eye  Left eye no matter , but red today    Used artificial tears in past- helped comfort and decreased redness , not used recently                  HPI  performed by Claudia Ferguson, OD  scribed by Tesha Altman Optometric Assistant       Review of Symptoms    EYES: See HPI  CONSTITUTIONAL: patient reports feeling healthy      Medical, surgical and family histories reviewed and updated 3/26/2018.         OBJECTIVE: See Ophthalmology exam    ASSESSMENT:    ICD-10-CM    1. Dry eyes H04.123 polyethylene glycol 0.4%- propylene glycol 0.3% (SYSTANE ULTRA) 0.4-0.3 % SOLN ophthalmic solution "   2. Pinguecula of both eyes H11.153 polyethylene glycol 0.4%- propylene glycol 0.3% (SYSTANE ULTRA) 0.4-0.3 % SOLN ophthalmic solution      PLAN:    Patient Instructions   Patient was advised of today's exam findings.  Use over the counter artificial tears 3 times a day (Thera Tears, Systane Ultra or Refresh Optive)  Return to clinic as needed     Claudia Ferguson O.D.  [facility]   [id number] [place] [address] [phone]           Again, thank you for allowing me to participate in the care of your patient.        Sincerely,        Claudia Ferguson, OD

## 2018-03-26 NOTE — MR AVS SNAPSHOT
After Visit Summary   3/26/2018    Tolu Villeda    MRN: [id number]           Patient Information     Date Of Birth          1955        Visit Information        Provider Department      3/26/2018 12:20 PM Claudia Ferguson OD [facility]        Care Instructions    Patient was advised of today's exam findings.  Use over the counter artificial tears 3 times a day (Thera Tears, Systane Ultra or Refresh Optive)  Return to clinic as needed     Claudia Ferguson O.D.  [facility]   [address] [place] [address]  [phone]            Follow-ups after your visit        Who to contact     If you have questions or need follow up information about today's clinic visit or your schedule please contact [facility] directly at [phone].  Normal or non-critical lab and imaging results will be communicated to you by [employer], letter or phone within 4 business days after the clinic has received the results. If you do not hear from us within 7 days, please contact the clinic through [employer] or phone. If you have a critical or abnormal lab result, we will notify you by phone as soon as possible.  Submit refill requests through [employer] or call your pharmacy and they will forward the refill request to us. Please allow 3 business days for your refill to be completed.          Additional Information About Your Visit        MyChart Information     [employer] gives you secure access to your electronic health record. If you see a primary care provider, you can also send messages to your care team and make appointments. If you have questions, please call your primary care clinic.  If you do not have a primary care provider, please call [phone] and they will assist you.        Care EveryWhere ID     This is your Care EveryWhere ID. This could be used by other organizations to access your [facility] medical records  [id number]         Blood Pressure from Last 3  Encounters:   03/12/18 110/60   10/24/17 114/58   07/03/17 130/56    Weight from Last 3 Encounters:   03/12/18 87.1 kg (192 lb)   10/24/17 83.9 kg (185 lb)   07/03/17 81.6 kg (180 lb)              Today, you had the following     No orders found for display       Primary Care Provider Office Phone # Fax #    Javed Maier [phone] [address] [facility] [address]        Equal Access to Services     [facility]: Hadii aad ku hadasho Soomaali, waaxda luqadaha, qaybta kaalmada adeegyada, cliff elliott . So [facility] [phone].    ATENCIÓN: Si habla español, tiene a ibanez disposición servicios gratuitos de asistencia lingüística. [facility] [phone].    We comply with applicable federal civil rights laws and [place] laws. We do not discriminate on the basis of race, color, national origin, age, disability, sex, sexual orientation, or gender identity.            Thank you!     Thank you for choosing [facility]  [facility] your [facility]. Our goal is always to provide you with excellent care. Hearing back from our patients is one way we can continue to improve our services. Please take a few minutes to complete the written survey that you may receive in the mail after your visit with us. Thank you!             Your Updated Medication List - Protect others around you: Learn how to safely use, store and throw away your medicines at www.disposemymeds.org.          This list is accurate as of 3/26/18  1:00 PM.  Always use your most recent med list.                   Brand Name Dispense Instructions for use Diagnosis    amLODIPine 10 MG tablet    NORVASC    90 tablet    Take 1 tablet (10 mg) by mouth daily    Essential hypertension with goal blood pressure less than 140/90       AQUAPHOR ADVANCED THERAPY Oint     30 g    Externally apply 1 dose topically 2 times daily    Adenocarcinoma of lung, left (H), Acneiform rash       ASPIRIN NOT PRESCRIBED     INTENTIONAL    0 each    1 each continuous prn Antiplatelet medication not prescribed intentionally due to oncology therapy.    Type 2 diabetes mellitus with hyperglycemia, without long-term current use of insulin (H)       atorvastatin 40 MG tablet    LIPITOR    90 tablet    TAKE ONE TABLET BY MOUTH ONE TIME DAILY    Hyperlipidemia with target LDL less than 100       blood glucose monitoring test strip    DEISY CONTOUR    200 strip    3 times daily.    Type 2 diabetes, HbA1c goal < 7% (H)       carvedilol 25 MG tablet    COREG    180 tablet    TAKE 1 TABLET (25 MG) BY MOUTH 2 TIMES DAILY (WITH MEALS)    Essential hypertension with goal blood pressure less than 140/90       clindamycin 1 % topical gel    CLINDAMAX    30 g    Use on face 2 times a day    Acne       clobetasol propionate 0.05 % Sham    CLOBEX    118 mL    Externally apply 1 Bottle topically daily Apply to scalp prior to showering.  Leave on for 15 min    Dry scalp, Adenocarcinoma of lung, unspecified laterality (H)       diphenoxylate-atropine 2.5-0.025 MG per tablet    LOMOTIL    40 tablet    Take 1 tablet by mouth 4 times daily as needed for diarrhea    Acute diarrhea       famotidine 40 MG tablet    PEPCID    90 tablet    TAKE 1 TABLET (40 MG) BY MOUTH AT BEDTIME    Dyspepsia and disorder of function of stomach       fluocinonide 0.05 % cream    LIDEX          glyBURIDE 5 MG tablet    DIABETA /MICRONASE          HYDROcodone-acetaminophen 5-325 MG per tablet    NORCO     Take 1 tablet by mouth every 4 hours as needed for moderate to severe pain        JENTADUETO 2.5-1000 MG per tablet   Generic drug:  linagliptin-metFORMIN     180 tablet    TAKE ONE TABLET BY MOUTH TWICE DAILY with food    Type 2 diabetes mellitus with hyperglycemia, without long-term current use of insulin (H)       lisinopril 40 MG tablet    PRINIVIL/ZESTRIL    60 tablet    TAKE ONE TABLET BY MOUTH TWICE DAILY    Essential hypertension with goal blood pressure less than 140/90        minocycline 100 MG tablet    DYNACIN    28 tablet    Take 1 tablet (100 mg) by mouth daily    Acneiform rash       mometasone 0.1 % cream    ELOCON    45 g    APPLY 1 GRAM TOPICALLY DAILY    Perioral dermatitis       MULTIVITAMIN PO      1 TABLET DAILY        nystatin cream    MYCOSTATIN          order for DME     1 Units    Equipment being ordered: Home Glucometer    Type 2 diabetes, HbA1c goal < 7% (H)       order for DME     400 strip    Equipment being ordered: Glucometer test strips for three times daily testing--fill with appropriate brand for the new glucometer    Type 2 diabetes, HbA1c goal < 7% (H)       osimertinib 80 MG tablet    TAGRISSO     Take 80 mg by mouth daily        pioglitazone 30 MG tablet    ACTOS    90 tablet    Take 1 tablet (30 mg) by mouth daily    Type 2 diabetes mellitus with hyperglycemia, without long-term current use of insulin (H)       potassium chloride 10 MEQ tablet    K-TAB,KLOR-CON    90 tablet    TAKE ONE TABLET BY MOUTH ONE TIME DAILY    Essential hypertension with goal blood pressure less than 140/90       prochlorperazine 10 MG tablet    COMPAZINE          sulfacetamide 10 % ophthalmic solution    BLEPH-10     Apply 1 drop to eye every 2 hours (while awake)        tamsulosin 0.4 MG capsule    FLOMAX    90 capsule    TAKE ONE CAPSULE BY MOUTH ONE TIME DAILY    Enlarged prostate with lower urinary tract symptoms (LUTS)

## 2018-04-24 NOTE — TELEPHONE ENCOUNTER
JENTADUETO 2.5-1000 MG per tablet  Prescription approved per [facility] Refill Protocol.    Per LOV to follow up around 07/12/2018. Frannie Hutchins, RN, BSN

## 2018-04-30 NOTE — TELEPHONE ENCOUNTER
"Requested Prescriptions   Pending Prescriptions Disp Refills     JENTADUETO 2.5-1000 MG per tablet [Pharmacy Med Name: Jentadueto Oral Tablet 2.5-1000 MG] 180 tablet 0     Sig: TAKE ONE TABLET BY MOUTH TWICE DAILY with food    Combination Oral Antihyperglycemic Agents Passed    4/27/2018  8:29 AM       Passed - Blood pressure under 140/90 in past 12 months    BP Readings from Last 3 Encounters:   03/12/18 110/60   10/24/17 114/58   07/03/17 130/56                Passed - Patient has a documented LDL level within past 12 mos.    Recent Labs   Lab Test  03/12/18   1223   LDL  43            Passed - Patient has a documented Microalbumin level within past 12 mos.    Recent Labs   Lab Test  03/12/18   1225   MICROL  35   UMALCR  19.03*            Passed - Patient has documented A1c within the specified period of time.    Recent Labs   Lab Test  03/12/18   1223   A1C  5.6            Passed - Patient's CR is NOT>1.4 OR Patient's EGFR is NOT<45 within past 12 mos.    Recent Labs   Lab Test  03/12/18   1223   GFRESTIMATED  63   GFRESTBLACK  76       Recent Labs   Lab Test  03/12/18   1223   CR  1.16            Passed - Patient does not have a diagnosis of CHF.       Passed - Patient is 18 years old or older.       Passed - Recent (6 mo) or future (30 days) visit within the authorizing provider's specialty    Patient had office visit in the last 6 months or has a visit in the next 30 days with authorizing provider or within the authorizing provider's specialty.  See \"Patient Info\" tab in inbasket, or \"Choose Columns\" in Meds & Orders section of the refill encounter.            Script was sent 04/24/2018 should not be out.    Jorje Lopez, RN, BSN         "

## 2018-05-17 NOTE — TELEPHONE ENCOUNTER
Carvedilol    Prescription approved per [facility] Refill Protocol.    Salma Hurtado, RN, BSN

## 2018-05-18 NOTE — TELEPHONE ENCOUNTER
"Requested Prescriptions   Pending Prescriptions Disp Refills     lisinopril (PRINIVIL/ZESTRIL) 40 MG tablet [Pharmacy Med Name: Lisinopril Oral Tablet 40 MG] 180 tablet 4     Sig: TAKE ONE TABLET BY MOUTH TWICE DAILY    ACE Inhibitors (Including Combos) Protocol Passed    5/17/2018  9:56 AM       Passed - Blood pressure under 140/90 in past 12 months    BP Readings from Last 3 Encounters:   03/12/18 110/60   10/24/17 114/58   07/03/17 130/56                Passed - Recent (12 mo) or future (30 days) visit within the authorizing provider's specialty    Patient had office visit in the last 12 months or has a visit in the next 30 days with authorizing provider or within the authorizing provider's specialty.  See \"Patient Info\" tab in inbasket, or \"Choose Columns\" in Meds & Orders section of the refill encounter.           Passed - Patient is age 18 or older       Passed - Normal serum creatinine on file in past 12 months    Recent Labs   Lab Test  03/12/18   1223   CR  1.16            Passed - Normal serum potassium on file in past 12 months    Recent Labs   Lab Test  03/12/18   1223   POTASSIUM  4.5             Prescription approved per [facility] Refill [facility].  Pt to follow up on July per 3/12/18 OV.    Lisset Barron RN    "

## 2018-08-02 NOTE — TELEPHONE ENCOUNTER
"Requested Prescriptions   Pending Prescriptions Disp Refills     JENTADUETO 2.5-1000 MG per tablet [Pharmacy Med Name: Jentadueto Oral Tablet 2.5-1000 MG] 180 tablet 0     Sig: TAKE ONE TABLET BY MOUTH TWICE DAILY with food    Combination Oral Antihyperglycemic Agents Passed    8/1/2018 11:42 PM       Passed - Blood pressure under 140/90 in past 12 months    BP Readings from Last 3 Encounters:   03/12/18 110/60   10/24/17 114/58   07/03/17 130/56          Passed - Patient has a documented LDL level within past 12 mos.    Recent Labs   Lab Test  03/12/18   1223   LDL  43          Passed - Patient has a documented Microalbumin level within past 12 mos.    Recent Labs   Lab Test  03/12/18   1225   MICROL  35   UMALCR  19.03*          Passed - Patient has documented A1c within the specified period of time.    If HgbA1C is 8 or greater, it needs to be on file within the past 3 months.  If less than 8, must be on file within the past 6 months.     Recent Labs   Lab Test  03/12/18   1223   A1C  5.6          Passed - Patient's CR is NOT>1.4 OR Patient's EGFR is NOT<45 within past 12 mos.    Recent Labs   Lab Test  03/12/18   1223   GFRESTIMATED  63   GFRESTBLACK  76     Recent Labs   Lab Test  03/12/18   1223   CR  1.16          Passed - Patient does not have a diagnosis of CHF.       Passed - Patient is 18 years old or older.       Passed - Recent (6 mo) or future (30 days) visit within the authorizing provider's specialty    Patient had office visit in the last 6 months or has a visit in the next 30 days with authorizing provider or within the authorizing provider's specialty.  See \"Patient Info\" tab in inbasket, or \"Choose Columns\" in Meds & Orders section of the refill encounter.            JENTADUETO 2.5-1000 MG per tablet  Medication is being filled for 1 time refill only due to:  Patient needs to be seen because due for 4 month diabetic follow up.     Please assist with scheduling.    Frannie Hutchins RN, BSN         "

## 2018-08-02 NOTE — PROGRESS NOTES
SUBJECTIVE:   Tolu Villeda is a 63 year old male who presents to clinic today for the following health issues:      Diabetes     Diabetes:     Frequency of checking blood sugars::  1 time a day    Diabetic concerns::  None    Hypoglycemia symptoms::  None    Paraesthesia present::  YES (tingling )    Eye Exam in the last year::  Yes    Diabetes Management Resources    Hyperlipidemia:     Low fat/chol diet rating::  Fair    Taking Statins::  YES    Side effects from hypolipidemia medication::  No muscle aches from Statin    Lipid Medications or Supplements::  None    Hypertension:     Outpatient blood pressures:  Are being checked    Blood pressures checked at:  Home    Dietary sodium intake::  No added salt diet  Hypertension     Diabetes:     Frequency of checking blood sugars::  1 time a day    Diabetic concerns::  None    Hypoglycemia symptoms::  None    Paraesthesia present::  YES (tingling )    Eye Exam in the last year::  Yes    Diabetes Management Resources    Hyperlipidemia:     Low fat/chol diet rating::  Fair    Taking Statins::  YES    Side effects from hypolipidemia medication::  No muscle aches from Statin    Lipid Medications or Supplements::  None    Hypertension:     Outpatient blood pressures:  Are being checked    Blood pressures checked at:  Home    Dietary sodium intake::  No added salt diet  Hyperlipidemia     Diabetes:     Frequency of checking blood sugars::  1 time a day    Diabetic concerns::  None    Hypoglycemia symptoms::  None    Paraesthesia present::  YES (tingling )    Eye Exam in the last year::  Yes    Diabetes Management Resources    Hyperlipidemia:     Low fat/chol diet rating::  Fair    Taking Statins::  YES    Side effects from hypolipidemia medication::  No muscle aches from Statin    Lipid Medications or Supplements::  None    Hypertension:     Outpatient blood pressures:  Are being checked    Blood pressures checked at:  Home    Dietary sodium intake::  No added salt  diet      Problem list and histories reviewed & adjusted, as indicated.  Additional history: as documented        Recent Labs   Lab Test  08/07/18   1209  03/12/18   1223  10/24/17   1134  07/03/17   1209  11/21/16   1044   01/19/16   1210   A1C  5.7*  5.6  5.5  5.7  5.7   < >  5.9   LDL   --   43   --   43  51   < >  32   HDL   --   64   --   55  49   < >  51   TRIG   --   72   --   100  107   < >  103   ALT   --   22   --   23  22   --   16   CR   --   1.16  1.07  1.09  1.02   --   1.14   GFRESTIMATED   --   63  69  68  74   --   65   GFRESTBLACK   --   76  84  82  89   --   79   POTASSIUM   --   4.5  4.7  4.5  4.7   --   4.5   TSH   --   3.08   --    --    --    --   3.51    < > = values in this interval not displayed.      BP Readings from Last 3 Encounters:   08/07/18 124/66   03/12/18 110/60   10/24/17 114/58    Wt Readings from Last 3 Encounters:   08/07/18 194 lb (88 kg)   03/12/18 192 lb (87.1 kg)   10/24/17 185 lb (83.9 kg)                    ROS:  CONSTITUTIONAL: NEGATIVE for fever, chills, change in weight  INTEGUMENTARY/SKIN: NEGATIVE for worrisome rashes, moles or lesions  INTEGUMENTARY/SKIN: Controlled rosacea with current minocycline prescription.  EYES: NEGATIVE for vision changes or irritation  ENT/MOUTH: NEGATIVE for ear, mouth and throat problems  RESP: Ongoing care with his oncologist for left lung cancer.  Has been stable for multiple years on current oral medication.  CV: NEGATIVE for chest pain, palpitations or peripheral edema  CV: No cardiac issues.  Blood pressure appears to be well controlled.  GI: NEGATIVE for nausea, abdominal pain, heartburn, or change in bowel habits  : NEGATIVE for frequency, dysuria, or hematuria   male : A number of years ago was noted to have a mild elevation of PSA.  Has elected not to continue monitoring.  Discussion that he had with his oncologist is that this would not be important to follow-up at this time given his lung cancer  diagnosis.  MUSCULOSKELETAL: NEGATIVE for significant arthralgias or myalgia  NEURO: NEGATIVE for weakness, dizziness or paresthesias  ENDOCRINE: NEGATIVE for temperature intolerance, skin/hair changes  ENDOCRINE: Doing well long-term with diabetic management.  HEME: NEGATIVE for bleeding problems  PSYCHIATRIC: NEGATIVE for changes in mood or affect    OBJECTIVE:                                                    /66  Pulse 86  Temp 97  F (36.1  C) (Temporal)  Resp 16  Wt 194 lb (88 kg)  SpO2 98%  BMI 29.94 kg/m2  Body mass index is 29.94 kg/(m^2).  GENERAL: alert, active, no distress and cooperative  EYES: Eyes grossly normal to inspection, extraocular movements - intact, and PERRL  HENT: ear canals- normal; TMs- normal; Nose- normal; Mouth- no ulcers, no lesions  NECK: no tenderness, no adenopathy, no asymmetry, no masses, no stiffness; thyroid- normal to palpation  RESP: Mild diminished breath sounds at the left base posteriorly otherwise clear lung fields  CV: regular rates and rhythm, normal S1 S2, no S3 or S4 and no murmur, no click or rub -  ABDOMEN: soft, no tenderness, no  hepatosplenomegaly, no masses, normal bowel sounds  MS: extremities- no gross deformities noted, no edema  SKIN: no suspicious lesions, no rashes  NEURO: strength and tone- normal, sensory exam- grossly normal, mentation- intact, speech- normal, reflexes- symmetric  Diabetic foot exam: normal DP and PT pulses, no trophic changes or ulcerative lesions and normal sensory exam  BACK: no CVA tenderness, no paralumbar tenderness  PSYCH: Alert and oriented times 3; speech- coherent , normal rate and volume; able to articulate logical thoughts, able to abstract reason, no tangential thoughts, no hallucinations or delusions, affect- normal  LYMPHATICS: ant. cervical- normal, post. cervical- normal, axillary- normal, supraclavicular- normal, inguinal- normal    Diagnostic test results:  Diagnostic Test Results:  Results for orders  placed or performed in visit on 08/07/18 (from the past 24 hour(s))   Hemoglobin A1c   Result Value Ref Range    Hemoglobin A1C 5.7 (H) 0 - 5.6 %        ASSESSMENT/PLAN:                                                    1. Type 2 diabetes mellitus with hyperglycemia, without long-term current use of insulin (H)  Continues with good control.  A1c remains under 6%.  Continue current medications but would discontinue Glipizide completely to avoid any hypoglycemic concerns.  Four-month follow-up for ongoing diabetic management.  - Comprehensive metabolic panel  - Hemoglobin A1c    2. Essential hypertension with goal blood pressure less than 140/90  Blood pressure well controlled and no change in medications.  He appears to be tolerating Lisinopril 40 mg twice daily (over the usual dosage range).  There have been no ill effects.  Renal function is remained stable.  - Comprehensive metabolic panel    3. Malignant neoplasm of lower lobe of left lung (H)  Continued follow-up with his oncologist.    4. Hyperlipidemia with target LDL less than 100  Lipids have been excellent on statin therapy.    5. Gastroesophageal reflux disease without esophagitis  Continue current medications    6. Microalbuminuria  Rechecking levels today.  - Albumin Random Urine Quantitative with Creat Ratio    7. Elevated prostate specific antigen (PSA)  Noted in the past.  Not followed or checked based on the patient's preference and oncologist recommendation.      Follow up with Provider -Follow-up in 4 months for diabetic follow-up.  See Patient Instructions    The patient understood the rational for the diagnosis and treatment plan. All questions were answered to best of my ability and the patient's satisfaction.    Note: Chart documentation done in part with Dragon Voice Recognition software. Although reviewed after completion, some word and grammatical errors may remain.  Please consider this when interpreting information in this  chart.      Javed Maier MD  [facility]

## 2018-08-02 NOTE — TELEPHONE ENCOUNTER
Pt informed.  He scheduled an appt for next week.  Provider, would you be willing to send a 90 days prescription instead of the 30 days that were sent by the RN?    Thanks.

## 2018-08-07 NOTE — MR AVS SNAPSHOT
After Visit Summary   8/7/2018    Tolu Villeda    MRN: [id number]           Patient Information     Date Of Birth          1955        Visit Information        Provider Department      8/7/2018 11:20 AM Javed Maier MD [facility]        Today's Diagnoses     Type 2 diabetes mellitus with hyperglycemia, without long-term current use of insulin (H)    -  1    Essential hypertension with goal blood pressure less than 140/90        Malignant neoplasm of lower lobe of left lung (H)        Hyperlipidemia with target LDL less than 100        Gastroesophageal reflux disease without esophagitis        Microalbuminuria        Elevated prostate specific antigen (PSA)          Care Instructions    These are new changes to your current plan of care based on today's visit:    Medications stopped Glyburide --   Medications to be started    Change dose of this medication None   New treatments        Follow up appointments:    1.  FOLLOW UP WITH YOUR PRIMARY CARE PROVIDER: 4 month(s) for diabetic follow up with fasting blood work    2. FOLLOW UP WITH SPECIALIST: Oncologist as planned    aJved Maier MD                Follow-ups after your visit        Who to contact     If you have questions or need follow up information about today's clinic visit or your schedule please contact [facility] directly at [phone].  Normal or non-critical lab and imaging results will be communicated to you by MyChart, letter or phone within 4 business days after the clinic has received the results. If you do not hear from us within 7 days, please contact the clinic through MyChart or phone. If you have a critical or abnormal lab result, we will notify you by phone as soon as possible.  Submit refill requests through [employer] or call your pharmacy and they will forward the refill request to us. Please allow 3 business days for your refill to be completed.          Additional Information  About Your Visit        [employer] Information     [employer] gives you secure access to your electronic health record. If you see a primary care provider, you can also send messages to your care team and make appointments. If you have questions, please call your primary care clinic.  If you do not have a primary care provider, please call [phone] and they will assist you.        Care EveryWhere ID     This is your Care EveryWhere ID. This could be used by other organizations to access your [facility] medical records  [id number]        Your Vitals Were     Pulse Temperature Respirations Pulse Oximetry BMI (Body Mass Index)       86 97  F (36.1  C) (Temporal) 16 98% 29.94 kg/m2        Blood Pressure from Last 3 Encounters:   08/07/18 124/66   03/12/18 110/60   10/24/17 114/58    Weight from Last 3 Encounters:   08/07/18 194 lb (88 kg)   03/12/18 192 lb (87.1 kg)   10/24/17 185 lb (83.9 kg)              We Performed the Following     Albumin Random Urine Quantitative with Creat Ratio     Comprehensive metabolic panel     Hemoglobin A1c          Today's Medication Changes          These changes are accurate as of 8/7/18 12:08 PM.  If you have any questions, ask your nurse or doctor.               Stop taking these medicines if you haven't already. Please contact your care team if you have questions.     glyBURIDE 5 MG tablet   Commonly known as:  DIABETA /MICRONASE   Stopped by:  Javed Maier MD                    Primary Care Provider Office Phone # Fax #    Javed Maier [phone] [address] [place] [address]        Equal Access to Services     [facility]: Hadii caitlin brunson hadrodo Soera, waaxda luqadaha, qaybta kaalmada cliff collier . So [facility] [phone].    ATENCIÓN: Si habla español, tiene a ibanez disposición servicios gratuitos de asistencia lingüística. Llame al [phone].    We comply with applicable federal civil rights  laws and [place] laws. We do not discriminate on the basis of race, color, national origin, age, disability, sex, sexual orientation, or gender identity.            Thank you!     Thank you for choosing [facility]  for your care. Our goal is always to provide you with excellent care. Hearing back from our patients is one way we can continue to improve our services. Please take a few minutes to complete the written survey that you may receive in the mail after your visit with us. Thank you!             Your Updated Medication List - Protect others around you: Learn how to safely use, store and throw away your medicines at www.disposemymeds.org.          This list is accurate as of 8/7/18 12:08 PM.  Always use your most recent med list.                   Brand Name Dispense Instructions for use Diagnosis    amLODIPine 10 MG tablet    NORVASC    90 tablet    Take 1 tablet (10 mg) by mouth daily    Essential hypertension with goal blood pressure less than 140/90       AQUAPHOR ADVANCED THERAPY Oint     30 g    Externally apply 1 dose topically 2 times daily    Adenocarcinoma of lung, left (H), Acneiform rash       ASPIRIN NOT PRESCRIBED    INTENTIONAL    0 each    1 each continuous prn Antiplatelet medication not prescribed intentionally due to oncology therapy.    Type 2 diabetes mellitus with hyperglycemia, without long-term current use of insulin (H)       atorvastatin 40 MG tablet    LIPITOR    90 tablet    TAKE ONE TABLET BY MOUTH ONE TIME DAILY    Hyperlipidemia with target LDL less than 100       blood glucose monitoring test strip    DEISY CONTOUR    200 strip    3 times daily.    Type 2 diabetes, HbA1c goal < 7% (H)       carvedilol 25 MG tablet    COREG    180 tablet    TAKE ONE TABLET BY MOUTH TWICE DAILY with meals    Essential hypertension with goal blood pressure less than 140/90       clindamycin 1 % topical gel    CLINDAMAX    30 g    Use on face 2 times a day    Acne       clobetasol  propionate 0.05 % Sham    CLOBEX    118 mL    Externally apply 1 Bottle topically daily Apply to scalp prior to showering.  Leave on for 15 min    Dry scalp, Adenocarcinoma of lung, unspecified laterality (H)       diphenoxylate-atropine 2.5-0.025 MG per tablet    LOMOTIL    40 tablet    Take 1 tablet by mouth 4 times daily as needed for diarrhea    Acute diarrhea       famotidine 40 MG tablet    PEPCID    90 tablet    TAKE 1 TABLET (40 MG) BY MOUTH AT BEDTIME    Dyspepsia and disorder of function of stomach       fluocinonide 0.05 % cream    LIDEX          HYDROcodone-acetaminophen 5-325 MG per tablet    NORCO     Take 1 tablet by mouth every 4 hours as needed for moderate to severe pain        linagliptin-metFORMIN 2.5-1000 MG per tablet    JENTADUETO    180 tablet    TAKE ONE TABLET BY MOUTH TWICE DAILY with food    Type 2 diabetes mellitus with hyperglycemia, without long-term current use of insulin (H)       lisinopril 40 MG tablet    PRINIVIL/ZESTRIL    180 tablet    TAKE ONE TABLET BY MOUTH TWICE DAILY    Essential hypertension with goal blood pressure less than 140/90       minocycline 100 MG tablet    DYNACIN    28 tablet    Take 1 tablet (100 mg) by mouth daily    Acneiform rash       mometasone 0.1 % cream    ELOCON    45 g    APPLY 1 GRAM TOPICALLY DAILY    Perioral dermatitis       MULTIVITAMIN PO      1 TABLET DAILY        nystatin cream    MYCOSTATIN          order for DME     1 Units    Equipment being ordered: Home Glucometer    Type 2 diabetes, HbA1c goal < 7% (H)       order for DME     400 strip    Equipment being ordered: Glucometer test strips for three times daily testing--fill with appropriate brand for the new glucometer    Type 2 diabetes, HbA1c goal < 7% (H)       osimertinib 80 MG tablet    TAGRISSO     Take 80 mg by mouth daily        pioglitazone 30 MG tablet    ACTOS    90 tablet    Take 1 tablet (30 mg) by mouth daily    Type 2 diabetes mellitus with hyperglycemia, without long-term  current use of insulin (H)       polyethylene glycol 0.4%- propylene glycol 0.3% 0.4-0.3 % Soln ophthalmic solution    SYSTANE ULTRA     Place 1 drop into both eyes 3 times daily    Dry eyes, Pinguecula of both eyes       potassium chloride 10 MEQ tablet    K-TAB,KLOR-CON    90 tablet    TAKE ONE TABLET BY MOUTH ONE TIME DAILY    Essential hypertension with goal blood pressure less than 140/90       prochlorperazine 10 MG tablet    COMPAZINE          sulfacetamide 10 % ophthalmic solution    BLEPH-10     Apply 1 drop to eye every 2 hours (while awake)        tamsulosin 0.4 MG capsule    FLOMAX    90 capsule    TAKE ONE CAPSULE BY MOUTH ONE TIME DAILY    Enlarged prostate with lower urinary tract symptoms (LUTS)

## 2018-08-07 NOTE — PATIENT INSTRUCTIONS
These are new changes to your current plan of care based on today's visit:    Medications stopped Glyburide --   Medications to be started    Change dose of this medication None   New treatments        Follow up appointments:    1.  FOLLOW UP WITH YOUR PRIMARY CARE PROVIDER: 4 month(s) for diabetic follow up with fasting blood work    2. FOLLOW UP WITH SPECIALIST: Oncologist as planned    Javed Maier MD

## 2018-08-21 NOTE — TELEPHONE ENCOUNTER
Potassium:  Prescription approved per [facility] Refill Protocol.    Tiara Bill, RN, BSN

## 2018-08-30 NOTE — MR AVS SNAPSHOT
After Visit Summary   8/30/2018    Tolu Villeda    MRN: [id number]           Patient Information     Date Of Birth          1955        Visit Information        Provider Department      8/30/2018 11:00 AM Jimmy Ceron MD [facility]        Today's Diagnoses     Type 2 diabetes mellitus with hyperglycemia, without long-term current use of insulin (H)    -  1    Malignant neoplasm of lower lobe of left lung (H)        Essential hypertension with goal blood pressure less than 140/90           Follow-ups after your visit        Who to contact     If you have questions or need follow up information about today's clinic visit or your schedule please contact [facility] directly at [phone].  Normal or non-critical lab and imaging results will be communicated to you by MyChart, letter or phone within 4 business days after the clinic has received the results. If you do not hear from us within 7 days, please contact the clinic through [employer] or phone. If you have a critical or abnormal lab result, we will notify you by phone as soon as possible.  Submit refill requests through [employer] or call your pharmacy and they will forward the refill request to us. Please allow 3 business days for your refill to be completed.          Additional Information About Your Visit        MyChart Information     [employer] gives you secure access to your electronic health record. If you see a primary care provider, you can also send messages to your care team and make appointments. If you have questions, please call your primary care clinic.  If you do not have a primary care provider, please call [phone] and they will assist you.        Care EveryWhere ID     This is your Care EveryWhere ID. This could be used by other organizations to access your [facility] medical records  [id number]        Your Vitals Were     Pulse Temperature Respirations Height Pulse Oximetry BMI (Body Mass  "Index)    86 97.1  F (36.2  C) (Oral) 20 5' 7.5\" (1.715 m) 100% 30.09 kg/m2       Blood Pressure from Last 3 Encounters:   08/30/18 128/65   08/07/18 124/66   03/12/18 110/60    Weight from Last 3 Encounters:   08/30/18 195 lb (88.5 kg)   08/07/18 194 lb (88 kg)   03/12/18 192 lb (87.1 kg)              Today, you had the following     No orders found for display       Primary Care Provider Office Phone # Fax #    Javed Maier [phone] [address] [facility] [address]        Equal Access to Services     HAN CASTILLO : Hadii aad ku hadasho Soomaali, waaxda luqadaha, qaybta kaalmada adeegyada, waxyasmin elliott . So [facility] [phone].    ATENCIÓN: Si habla español, tiene a ibanez disposición servicios gratuitos de asistencia lingüística. [facility] [phone].    We comply with applicable federal civil rights laws and [place] laws. We do not discriminate on the basis of race, color, national origin, age, disability, sex, sexual orientation, or gender identity.            Thank you!     [facility] you [facility]  [facility]. Our goal is always to provide you with excellent care. Hearing back from our patients is one way we can continue to improve our services. Please take a few minutes to complete the written survey that you may receive in the mail after your visit with us. Thank you!             Your Updated Medication List - Protect others around you: Learn how to safely use, store and throw away your medicines at www.disposemymeds.org.          This list is accurate as of 8/30/18 11:51 AM.  Always use your most recent med list.                   Brand Name Dispense Instructions for use Diagnosis    amLODIPine 10 MG tablet    NORVASC    90 tablet    Take 1 tablet (10 mg) by mouth daily    Essential hypertension with goal blood pressure less than 140/90       AQUAPHOR ADVANCED THERAPY Oint     30 g    Externally apply 1 dose topically " 2 times daily    Adenocarcinoma of lung, left (H), Acneiform rash       ASPIRIN NOT PRESCRIBED    INTENTIONAL    0 each    1 each continuous prn Antiplatelet medication not prescribed intentionally due to oncology therapy.    Type 2 diabetes mellitus with hyperglycemia, without long-term current use of insulin (H)       atorvastatin 40 MG tablet    LIPITOR    90 tablet    TAKE ONE TABLET BY MOUTH ONE TIME DAILY    Hyperlipidemia with target LDL less than 100       blood glucose monitoring test strip    DEISY CONTOUR    200 strip    3 times daily.    Type 2 diabetes, HbA1c goal < 7% (H)       carvedilol 25 MG tablet    COREG    180 tablet    TAKE ONE TABLET BY MOUTH TWICE DAILY with meals    Essential hypertension with goal blood pressure less than 140/90       clindamycin 1 % topical gel    CLINDAMAX    30 g    Use on face 2 times a day    Acne       clobetasol propionate 0.05 % Sham    CLOBEX    118 mL    Externally apply 1 Bottle topically daily Apply to scalp prior to showering.  Leave on for 15 min    Dry scalp, Adenocarcinoma of lung, unspecified laterality (H)       diphenoxylate-atropine 2.5-0.025 MG per tablet    LOMOTIL    40 tablet    Take 1 tablet by mouth 4 times daily as needed for diarrhea    Acute diarrhea       famotidine 40 MG tablet    PEPCID    90 tablet    TAKE 1 TABLET (40 MG) BY MOUTH AT BEDTIME    Dyspepsia and disorder of function of stomach       fluocinonide 0.05 % cream    LIDEX          HYDROcodone-acetaminophen 5-325 MG per tablet    NORCO     Take 1 tablet by mouth every 4 hours as needed for moderate to severe pain        linagliptin-metFORMIN 2.5-1000 MG per tablet    JENTADUETO    180 tablet    TAKE ONE TABLET BY MOUTH TWICE DAILY with food    Type 2 diabetes mellitus with hyperglycemia, without long-term current use of insulin (H)       lisinopril 40 MG tablet    PRINIVIL/ZESTRIL    180 tablet    TAKE ONE TABLET BY MOUTH TWICE DAILY    Essential hypertension with goal blood pressure  less than 140/90       minocycline 100 MG tablet    DYNACIN    28 tablet    Take 1 tablet (100 mg) by mouth daily    Acneiform rash       mometasone 0.1 % cream    ELOCON    45 g    APPLY 1 GRAM TOPICALLY DAILY    Perioral dermatitis       MULTIVITAMIN PO      1 TABLET DAILY        nystatin cream    MYCOSTATIN          order for DME     1 Units    Equipment being ordered: Home Glucometer    Type 2 diabetes, HbA1c goal < 7% (H)       order for DME     400 strip    Equipment being ordered: Glucometer test strips for three times daily testing--fill with appropriate brand for the new glucometer    Type 2 diabetes, HbA1c goal < 7% (H)       osimertinib 80 MG tablet    TAGRISSO     Take 80 mg by mouth daily        pioglitazone 30 MG tablet    ACTOS    90 tablet    Take 1 tablet (30 mg) by mouth daily    Type 2 diabetes mellitus with hyperglycemia, without long-term current use of insulin (H)       polyethylene glycol 0.4%- propylene glycol 0.3% 0.4-0.3 % Soln ophthalmic solution    SYSTANE ULTRA     Place 1 drop into both eyes 3 times daily    Dry eyes, Pinguecula of both eyes       potassium chloride 10 MEQ tablet    K-TAB,KLOR-CON    90 tablet    TAKE ONE TABLET BY MOUTH ONE TIME DAILY    Essential hypertension with goal blood pressure less than 140/90       prochlorperazine 10 MG tablet    COMPAZINE          sulfacetamide 10 % ophthalmic solution    BLEPH-10     Apply 1 drop to eye every 2 hours (while awake)        tamsulosin 0.4 MG capsule    FLOMAX    90 capsule    TAKE ONE CAPSULE BY MOUTH ONE TIME DAILY    Enlarged prostate with lower urinary tract symptoms (LUTS)

## 2018-08-30 NOTE — PROGRESS NOTES
SUBJECTIVE:  Tolu Villeda, a 63 year old male scheduled an appointment to discuss the following issues:  Establish care.   History dm, htn, high cholesterol. History bph and lung cancer.   History lung cancer left - 2013 - slow growing. No surgery but on medication.   Seeing oncologist - [place] [facility]. Quit smoking. Breathing stable.   No feet changes. Emotionally doing ok.   Single. 1 daughter and one grandchild.   No hypoglycemia. Exercise - working p.t. gerd stable on pepcid and on mvi.     Past Medical History:   Diagnosis Date     Essential hypertension, benign 1978     Hyperlipidemia LDL goal < 100      Hypertriglyceridemia      Type II or unspecified type diabetes mellitus without mention of complication, uncontrolled 1994       Past Surgical History:   Procedure Laterality Date     COLONOSCOPY  12/04/2014    [facility]     SURGICAL HISTORY OF -   4/2009    Left testicular surgery with benign growth/probable hydrocele       Family History   Problem Relation Age of Onset     Diabetes Mother      Hypertension Mother      Diabetes Father      Hypertension Father      Alcohol/Drug Father      Hypertension Maternal Grandmother      Cerebrovascular Disease Maternal Grandmother      Hypertension Maternal Grandfather      Alcohol/Drug Maternal Grandfather      Hypertension Paternal Grandmother      Hypertension Paternal Grandfather      Asthma No family hx of      C.A.D. No family hx of      Breast Cancer No family hx of      Cancer - colorectal No family hx of      Prostate Cancer No family hx of      Alzheimer Disease No family hx of      Allergies No family hx of      Anesthesia Reaction No family hx of      Arthritis No family hx of      Blood Disease No family hx of      Cancer No family hx of      Hearing Loss No family hx of      Family History Negative No family hx of      Thyroid Disease No family hx of      Respiratory No family hx of      Osteoperosis No family hx of      Obesity No family hx of  "     Neurologic Disorder No family hx of      Psychotic Disorder No family hx of      Musculoskeletal Disorder No family hx of      Lipids No family hx of      HEART DISEASE No family hx of      Gynecology No family hx of      Genitourinary Problems No family hx of      Genetic Disorder No family hx of      GASTROINTESTINAL DISEASE No family hx of      Eye Disorder No family hx of      Endocrine Disease No family hx of      Depression No family hx of      Connective Tissue Disorder No family hx of      Congenital Anomalies No family hx of      Circulatory No family hx of      Cardiovascular No family hx of      KIDNEY DISEASE No family hx of      Anxiety Disorder No family hx of      Mental Illness No family hx of      Substance Abuse No family hx of      Other Cancer No family hx of      Colon Cancer No family hx of      Hyperlipidemia No family hx of      Glaucoma No family hx of      Macular Degeneration No family hx of        Social History   Substance Use Topics     Smoking status: Former Smoker     Years: 41.00     Types: Cigarettes     Quit date: 8/1/2013     Smokeless tobacco: Never Used     Alcohol use Yes      Comment: occasionally       ROS:  All other ROS negative  OBJECTIVE:  /65  Pulse 86  Temp 97.1  F (36.2  C) (Oral)  Resp 20  Ht 5' 7.5\" (1.715 m)  Wt 195 lb (88.5 kg)  SpO2 100%  BMI 30.09 kg/m2  EXAM:  GENERAL APPEARANCE: healthy, alert and no distress  EYES: EOMI,  PERRL  HENT: ear canals and TM's normal and nose and mouth without ulcers or lesions  NECK: no adenopathy, no asymmetry, masses, or scars and thyroid normal to palpation  RESP: lungs clear to auscultation - no rales, rhonchi or wheezes  CV: regular rates and rhythm, normal S1 S2, no S3 or S4 and no murmur, click or rub -  ABDOMEN:  soft, nontender, no HSM or masses and bowel sounds normal  MS: extremities normal- no gross deformities noted, no evidence of inflammation in joints, FROM in all extremities.  PSYCH: mentation " appears normal and affect normal/bright    ASSESSMENT / PLAN:  (E11.65) Type 2 diabetes mellitus with hyperglycemia, without long-term current use of insulin (H)  (primary encounter diagnosis)  Comment: stable  Plan: DR.Abraham canela - will take over med. Continue diet/exercise and current meds. Recheck in 6 months  Sooner if worse/new issues    (C34.32) Malignant neoplasm of lower lobe of left lung (H)  Comment: slowly progressive but currently stable with breathing  Plan: per oncology.     (I10) Essential hypertension with goal blood pressure less than 140/90  Comment: stable  Plan: continue meds. Chest pain to er. Self-monitor and continue exercise. Recheck in 6 months    Jimmy Ceron

## 2018-09-10 NOTE — PROGRESS NOTES
"  SUBJECTIVE:   Tolu Villeda is a 63 year old male who presents to clinic today for the following health issues:      HPI  Diabetes Follow-up  Patient is checking blood sugars: once daily.  Results are as follows:         bedtime - 115-120    Diabetic concerns: None     Symptoms of hypoglycemia (low blood sugar): none     Paresthesias (numbness or burning in feet) or sores: Yes \"sometimes they tingle\"     Date of last diabetic eye exam: less than one year ago    BP Readings from Last 2 Encounters:   09/14/18 118/70   08/30/18 128/65     Hemoglobin A1C (%)   Date Value   08/07/2018 5.7 (H)   03/12/2018 5.6     LDL Cholesterol Calculated (mg/dL)   Date Value   03/12/2018 43   07/03/2017 43   Diabetes Management Resources    Hyperlipidemia Follow-Up    Rate your low fat/cholesterol diet?: good    Taking statin?  Yes, no muscle aches from statin    Other lipid medications/supplements?:  none    Hypertension Follow-up    Outpatient blood pressures are being checked at home.  Results are 120-140/66-75.    Low Salt Diet: no added salt    Problem list and histories reviewed & adjusted, as indicated.  Additional history: as documented    Patient notes that his previous [place] clinic right by his house was a bad experience. He states that the staff were rude to him, and didn't seem to care about him, as if he was just a number not a person, and there were patient mix ups.    Patient reports good control of his diabetes. He notes that he is eating healthy and feels like he still eats enough \"fun things\" in his diet. He states that his feet tingle every now and then, but otherwise are okay. He puts lotion on his feet daily so he looks at them regularly.    He has been diagnosed with stage 4 lung cancer and is managing it with Tagrisso 80 mg daily. He notes good results from the Tagrisso: there is a little scar tissue, slight fluid in left lung that he doesn't notice it, but can be auscultated, and all pulmonary nodules " are gone, but there is a tumor present in his left lower lung that has not metastasized. He has a daughter and granddaughter living with him and it is a positive influence on both of them.     He states that he does not sleep well, he hasn't for about 20 years. He usually gets about 4 hours of sleep and pantoja not feel rested upon waking. He notes that he falls asleep easily, but has difficulty staying asleep.     He is taking his hypertension medication as directed and adhering to a low salt diet. Denies chest pain or shortness of breath.    He is taking his Pepcid daily.    He states that with his Flomax, his urination is pretty normal. He also has had issues with erectile dysfunction. He notes that in the past Viagra has not been effective for his erectile dysfunction.    He notes that his father had prostate issue. He has a history of prostate issues, enlargement and infections, since he was 23 years old. He does not see a urologist and his oncologist doesn't monitor it. He states that he doesn't want to deal with it as his medication controls it currently and he has enough with his lungs.    He is  from his second wife that was finalized about 1 month ago. He would like his first wife as his emergency contact not his most current ex wife. He has a girlfriend that is an RN in the [employer]. He states that he met her online and will probably visit her this spring. They talk on video chat and calls. He believes that he is probably going to pursue the relationship to marriage. He notes that he is seeing a  to try and get her over here. His daughter likes this woman and they get along well.      Patient Active Problem List   Diagnosis     Hyperlipidemia with target LDL less than 100     Smoking-quit in 8/12     GERD (gastroesophageal reflux disease)     ED (erectile dysfunction)     BMI 34.0-34.9,adult     Advanced directives, counseling/discussion     Microalbuminuria     Adenocarcinoma of  lung (H)     Insomnia     Acneiform rash     Elevated prostate specific antigen (PSA)     Low back pain     Malignant neoplasm of lower lobe of left lung (H)     Type 2 diabetes mellitus with hyperglycemia, without long-term current use of insulin (H)     Essential hypertension with goal blood pressure less than 140/90     Health Care Home     Past Surgical History:   Procedure Laterality Date     COLONOSCOPY  12/04/2014    [facility]     SURGICAL HISTORY OF -   4/2009    Left testicular surgery with benign growth/probable hydrocele       Social History   Substance Use Topics     Smoking status: Former Smoker     Years: 41.00     Types: Cigarettes     Quit date: 8/1/2013     Smokeless tobacco: Never Used     Alcohol use Yes      Comment: occasionally     Family History   Problem Relation Age of Onset     Diabetes Mother      Hypertension Mother      Diabetes Father      Hypertension Father      Alcohol/Drug Father      Hypertension Maternal Grandmother      Cerebrovascular Disease Maternal Grandmother      Hypertension Maternal Grandfather      Alcohol/Drug Maternal Grandfather      Hypertension Paternal Grandmother      Hypertension Paternal Grandfather      Asthma No family hx of      C.A.D. No family hx of      Breast Cancer No family hx of      Cancer - colorectal No family hx of      Prostate Cancer No family hx of      Alzheimer Disease No family hx of      Allergies No family hx of      Anesthesia Reaction No family hx of      Arthritis No family hx of      Blood Disease No family hx of      Cancer No family hx of      Hearing Loss No family hx of      Family History Negative No family hx of      Thyroid Disease No family hx of      Respiratory No family hx of      Osteoporosis No family hx of      Obesity No family hx of      Neurologic Disorder No family hx of      Psychotic Disorder No family hx of      Musculoskeletal Disorder No family hx of      Lipids No family hx of      HEART DISEASE No  family hx of      Gynecology No family hx of      Genitourinary Problems No family hx of      Genetic Disorder No family hx of      GASTROINTESTINAL DISEASE No family hx of      Eye Disorder No family hx of      Endocrine Disease No family hx of      Depression No family hx of      Connective Tissue Disorder No family hx of      Congenital Anomalies No family hx of      Circulatory No family hx of      Cardiovascular No family hx of      KIDNEY DISEASE No family hx of      Anxiety Disorder No family hx of      Mental Illness No family hx of      Substance Abuse No family hx of      Other Cancer No family hx of      Colon Cancer No family hx of      Hyperlipidemia No family hx of      Glaucoma No family hx of      Macular Degeneration No family hx of          Current Outpatient Prescriptions   Medication Sig Dispense Refill     amLODIPine (NORVASC) 10 MG tablet Take 1 tablet (10 mg) by mouth daily 90 tablet 1     atorvastatin (LIPITOR) 40 MG tablet Take 1 tablet (40 mg) by mouth daily 90 tablet 3     carvedilol (COREG) 25 MG tablet TAKE ONE TABLET BY MOUTH TWICE DAILY with meals 180 tablet 1     clindamycin (CLINDAMAX) 1 % gel Use on face 2 times a day 30 g 3     clobetasol propionate (CLOBEX) 0.05 % SHAM Externally apply 1 Bottle topically daily Apply to scalp prior to showering.  Leave on for 15 min 118 mL 1     diphenoxylate-atropine (LOMOTIL) 2.5-0.025 MG per tablet Take 1 tablet by mouth 4 times daily as needed for diarrhea 40 tablet 3     Emollient (AQUAPHOR ADVANCED THERAPY) OINT Externally apply 1 dose topically 2 times daily 30 g 2     famotidine (PEPCID) 40 MG tablet TAKE 1 TABLET (40 MG) BY MOUTH AT BEDTIME 90 tablet 3     fluocinonide (LIDEX) 0.05 % cream        glucose blood VI test strips (ASCENSIA MICROFILL TEST) strip 3 times daily. 200 strip 5     HYDROcodone-acetaminophen (NORCO) 5-325 MG per tablet Take 1 tablet by mouth every 4 hours as needed for moderate to severe pain        linagliptin-metFORMIN (JENTADUETO) 2.5-1000 MG per tablet TAKE ONE TABLET BY MOUTH TWICE DAILY with food 180 tablet 1     lisinopril (PRINIVIL/ZESTRIL) 40 MG tablet Take 1 tablet (40 mg) by mouth 2 times daily 180 tablet 1     minocycline (DYNACIN) 100 MG tablet Take 1 tablet (100 mg) by mouth daily 28 tablet 1     mometasone (ELOCON) 0.1 % cream APPLY 1 GRAM TOPICALLY DAILY 45 g 1     MULTIVITAMIN OR 1 TABLET DAILY       nystatin (MYCOSTATIN) cream   0     ORDER FOR DME Equipment being ordered: Glucometer test strips for three times daily testing--fill with appropriate brand for the new glucometer 400 strip 3     ORDER FOR DME Equipment being ordered: Home Glucometer 1 Units 1     osimertinib (TAGRISSO) 80 MG tablet Take 80 mg by mouth daily       pioglitazone (ACTOS) 30 MG tablet Take 1 tablet (30 mg) by mouth daily 90 tablet 1     polyethylene glycol 0.4%- propylene glycol 0.3% (SYSTANE ULTRA) 0.4-0.3 % SOLN ophthalmic solution Place 1 drop into both eyes 3 times daily       potassium chloride (K-TAB,KLOR-CON) 10 MEQ tablet TAKE ONE TABLET BY MOUTH ONE TIME DAILY 90 tablet 3     prochlorperazine (COMPAZINE) 10 MG tablet        sulfacetamide (BLEPH-10) 10 % ophthalmic solution Apply 1 drop to eye every 2 hours (while awake)       tamsulosin (FLOMAX) 0.4 MG capsule TAKE ONE CAPSULE BY MOUTH ONE TIME DAILY  90 capsule 3     ASPIRIN NOT PRESCRIBED (INTENTIONAL) 1 each continuous prn Antiplatelet medication not prescribed intentionally due to oncology therapy. 0 each 0     [DISCONTINUED] amLODIPine (NORVASC) 10 MG tablet Take 1 tablet (10 mg) by mouth daily 90 tablet 1     [DISCONTINUED] atorvastatin (LIPITOR) 40 MG tablet TAKE ONE TABLET BY MOUTH ONE TIME DAILY  90 tablet 2     [DISCONTINUED] lisinopril (PRINIVIL/ZESTRIL) 40 MG tablet TAKE ONE TABLET BY MOUTH TWICE DAILY  180 tablet 0     Allergies   Allergen Reactions     Aspirin      Recent Labs   Lab Test  08/07/18   1209  03/12/18   1223  10/24/17   1134   07/03/17   1209  11/21/16   1044   01/19/16   1210   A1C  5.7*  5.6  5.5  5.7  5.7   < >  5.9   LDL   --   43   --   43  51   < >  32   HDL   --   64   --   55  49   < >  51   TRIG   --   72   --   100  107   < >  103   ALT  25  22   --   23  22   --   16   CR  1.18  1.16  1.07  1.09  1.02   --   1.14   GFRESTIMATED  62  63  69  68  74   --   65   GFRESTBLACK  75  76  84  82  89   --   79   POTASSIUM  5.1  4.5  4.7  4.5  4.7   --   4.5   TSH   --   3.08   --    --    --    --   3.51    < > = values in this interval not displayed.      BP Readings from Last 3 Encounters:   09/14/18 118/70   08/30/18 128/65   08/07/18 124/66    Wt Readings from Last 3 Encounters:   09/14/18 89 kg (196 lb 4.8 oz)   08/30/18 88.5 kg (195 lb)   08/07/18 88 kg (194 lb)        ROS:  Constitutional, HEENT, cardiovascular, pulmonary, GI, , musculoskeletal, neuro, skin, endocrine and psych systems are negative, except as otherwise noted.    This document serves as a record of the services and decisions personally performed and made by Alyx Hahn CNP. It was created on his/her behalf by Gena Guadalupe, trained medical scribe. The creation of this document is based the provider's statements to the medical scribes.    Scribshaina Guadalupe 11:38 AM, September 14, 2018  OBJECTIVE:     /70  Pulse 70  Temp 97.9  F (36.6  C) (Temporal)  Resp 16  Wt 89 kg (196 lb 4.8 oz)  SpO2 98%  BMI 30.29 kg/m2  Body mass index is 30.29 kg/(m^2).     GENERAL: healthy, alert and no distress  HENT: ear canals and TM's normal, nose and mouth without ulcers or lesions  NECK: no adenopathy, no asymmetry, masses, or scars and thyroid normal to palpation  RESP: lungs clear to auscultation - no rales, rhonchi or wheezes  CV: regular rate and rhythm, normal S1 S2, no S3 or S4, no murmur, click or rub, no peripheral edema and peripheral pulses strong  MS: no gross musculoskeletal defects noted, no edema  NEURO: Normal strength and tone, mentation intact and  speech normal  PSYCH: mentation appears normal, affect normal/bright    Diagnostic Test Results:  No results found for this or any previous visit (from the past 24 hour(s)).    ASSESSMENT/PLAN:       ICD-10-CM    1. Type 2 diabetes mellitus with hyperglycemia, without long-term current use of insulin (H) E11.65    2. Screening for HIV (human immunodeficiency virus) Z11.4 HIV Screening   3. Essential hypertension with goal blood pressure less than 140/90 I10 lisinopril (PRINIVIL/ZESTRIL) 40 MG tablet     amLODIPine (NORVASC) 10 MG tablet   4. Dyspepsia and disorder of function of stomach K31.9 famotidine (PEPCID) 40 MG tablet    R10.13    5. Hyperlipidemia with target LDL less than 100 E78.5 atorvastatin (LIPITOR) 40 MG tablet   6. Erectile dysfunction due to diseases classified elsewhere N52.1    7. Benign prostatic hyperplasia with nocturia N40.1     R35.1      Discussed diabetes, lung cancer, hypertension, and hyperlipidemia at length with patient. He reports good control on current medications with no reported side effects. Plan to continue on current use. Refills provided.   Pt. Declines prostate cancer screening.     Future HIV screening lab placed. Patient will complete when other screening labs are needed to be updated.    Travel clinic referral provided for patient to schedule for his travel to the [facility] in the next 6 months.     High dose influenza vaccination administered today.    Follow up with PCP in 3-4 months for diabetes, hypertension, and hyperlipidemia.         The information in this document, created by the medical scribe for me, accurately reflects the services I personally performed and the decisions made by me. I have reviewed and approved this document for accuracy prior to leaving the patient care area.  Alyx Hahn CNP  11:38 AM, 09/14/18  [facility]    Injectable Influenza Immunization Documentation    1.  Is the person to be vaccinated sick  today?   No    2. Does the person to be vaccinated have an allergy to a component   of the vaccine?   No  Egg Allergy Algorithm Link    3. Has the person to be vaccinated ever had a serious reaction   to influenza vaccine in the past?   No    4. Has the person to be vaccinated ever had Guillain-Barré syndrome?   No    Form completed by Janell Merino CMA ([facility])

## 2018-09-14 NOTE — MR AVS SNAPSHOT
After Visit Summary   9/14/2018    Tolu Villeda    MRN: [id number]           Patient Information     Date Of Birth          1955        Visit Information        Provider Department      9/14/2018 11:20 AM Alyx Hahn APRN CNP [facility]        Today's Diagnoses     Type 2 diabetes mellitus with hyperglycemia, without long-term current use of insulin (H)    -  1    Screening for HIV (human immunodeficiency virus)        Essential hypertension with goal blood pressure less than 140/90        Dyspepsia and disorder of function of stomach        Hyperlipidemia with target LDL less than 100        Erectile dysfunction due to diseases classified elsewhere        Benign prostatic hyperplasia with nocturia        Need for prophylactic vaccination and inoculation against influenza           Follow-ups after your visit        Follow-up notes from your care team     Return in about 3 months (around 12/14/2018) for Lab Work, BP Recheck, Routine Visit.      Future tests that were ordered for you today     Open Future Orders        Priority Expected Expires Ordered    HIV Screening Routine  9/14/2019 9/14/2018            Who to contact     If you have questions or need follow up information about today's clinic visit or your schedule please contact [facility] directly at [phone].  Normal or non-critical lab and imaging results will be communicated to you by MyChart, letter or phone within 4 business days after the clinic has received the results. If you do not hear from us within 7 days, please contact the clinic through MyChart or phone. If you have a critical or abnormal lab result, we will notify you by phone as soon as possible.  Submit refill requests through [employer] or call your pharmacy and they will forward the refill request to us. Please allow 3 business days for your refill to be completed.          Additional Information About Your Visit        MyChart  Information     [employer] gives you secure access to your electronic health record. If you see a primary care provider, you can also send messages to your care team and make appointments. If you have questions, please call your primary care clinic.  If you do not have a primary care provider, please call [phone] and they will assist you.        Care EveryWhere ID     This is your Care EveryWhere ID. This could be used by other organizations to access your [facility] medical records  [id number]        Your Vitals Were     Pulse Temperature Respirations Pulse Oximetry BMI (Body Mass Index)       70 97.9  F (36.6  C) (Temporal) 16 98% 30.29 kg/m2        Blood Pressure from Last 3 Encounters:   09/14/18 118/70   08/30/18 128/65   08/07/18 124/66    Weight from Last 3 Encounters:   09/14/18 196 lb 4.8 oz (89 kg)   08/30/18 195 lb (88.5 kg)   08/07/18 194 lb (88 kg)              We Performed the Following     FLU VACCINE, INCREASED ANTIGEN, PRESV FREE, AGE 65+ [29182]     Vaccine Administration, Initial [57516]          Today's Medication Changes          These changes are accurate as of 9/14/18  5:15 PM.  If you have any questions, ask your nurse or doctor.               These medicines have changed or have updated prescriptions.        Dose/Directions    atorvastatin 40 MG tablet   Commonly known as:  LIPITOR   This may have changed:  See the new instructions.   Used for:  Hyperlipidemia with target LDL less than 100   Changed by:  Alyx Hahn APRN CNP        Dose:  40 mg   Take 1 tablet (40 mg) by mouth daily   Quantity:  90 tablet   Refills:  3       lisinopril 40 MG tablet   Commonly known as:  PRINIVIL/ZESTRIL   This may have changed:  See the new instructions.   Used for:  Essential hypertension with goal blood pressure less than 140/90   Changed by:  Alyx Hahn APRN CNP        Dose:  40 mg   Take 1 tablet (40 mg) by mouth 2 times daily   Quantity:  180 tablet   Refills:  1            Where to get your  medicines      These medications were sent to [id number] - [place], [place] - [address]  [address] [place] [address]    Hours:  test fax sent successfully 7/31/03 kr Phone:  [phone]     amLODIPine 10 MG tablet    atorvastatin 40 MG tablet    famotidine 40 MG tablet    lisinopril 40 MG tablet                Primary Care Provider Office Phone # Fax #    Javed Maier [phone] [address]  [place] [address]        Equal Access to Services     [facility]: Hadii aad ku hadasho Soomaali, waaxda luqadaha, qaybta kaalmada adeegyada, waxyasmin vyasin hayaan adeeg ced elliott . So [facility] [phone].    ATENCIÓN: Si habla español, tiene a ibanez disposición servicios gratuitos de asistencia lingüística. [facility] [phone].    We comply with applicable federal civil rights laws and [place] laws. We do not discriminate on the basis of race, color, national origin, age, disability, sex, sexual orientation, or gender identity.            Thank you!     Thank you for choosing [facility]  for your care. Our goal is always to provide you with excellent care. Hearing back from our patients is one way we can continue to improve our services. Please take a few minutes to complete the written survey that you may receive in the mail after your visit with us. Thank you!             Your Updated Medication List - Protect others around you: Learn how to safely use, store and throw away your medicines at www.disposemymeds.org.          This list is accurate as of 9/14/18  5:15 PM.  Always use your most recent med list.                   Brand Name Dispense Instructions for use Diagnosis    amLODIPine 10 MG tablet    NORVASC    90 tablet    Take 1 tablet (10 mg) by mouth daily    Essential hypertension with goal blood pressure less than 140/90       AQUAPHOR ADVANCED THERAPY Oint     30 g    Externally apply 1 dose topically 2 times daily     Adenocarcinoma of lung, left (H), Acneiform rash       ASPIRIN NOT PRESCRIBED    INTENTIONAL    0 each    1 each continuous prn Antiplatelet medication not prescribed intentionally due to oncology therapy.    Type 2 diabetes mellitus with hyperglycemia, without long-term current use of insulin (H)       atorvastatin 40 MG tablet    LIPITOR    90 tablet    Take 1 tablet (40 mg) by mouth daily    Hyperlipidemia with target LDL less than 100       blood glucose monitoring test strip    DEISY CONTOUR    200 strip    3 times daily.    Type 2 diabetes, HbA1c goal < 7% (H)       carvedilol 25 MG tablet    COREG    180 tablet    TAKE ONE TABLET BY MOUTH TWICE DAILY with meals    Essential hypertension with goal blood pressure less than 140/90       clindamycin 1 % topical gel    CLINDAMAX    30 g    Use on face 2 times a day    Acne       clobetasol propionate 0.05 % Sham    CLOBEX    118 mL    Externally apply 1 Bottle topically daily Apply to scalp prior to showering.  Leave on for 15 min    Dry scalp, Adenocarcinoma of lung, unspecified laterality (H)       diphenoxylate-atropine 2.5-0.025 MG per tablet    LOMOTIL    40 tablet    Take 1 tablet by mouth 4 times daily as needed for diarrhea    Acute diarrhea       famotidine 40 MG tablet    PEPCID    90 tablet    TAKE 1 TABLET (40 MG) BY MOUTH AT BEDTIME    Dyspepsia and disorder of function of stomach       fluocinonide 0.05 % cream    LIDEX          HYDROcodone-acetaminophen 5-325 MG per tablet    NORCO     Take 1 tablet by mouth every 4 hours as needed for moderate to severe pain        linagliptin-metFORMIN 2.5-1000 MG per tablet    JENTADUETO    180 tablet    TAKE ONE TABLET BY MOUTH TWICE DAILY with food    Type 2 diabetes mellitus with hyperglycemia, without long-term current use of insulin (H)       lisinopril 40 MG tablet    PRINIVIL/ZESTRIL    180 tablet    Take 1 tablet (40 mg) by mouth 2 times daily    Essential hypertension with goal blood pressure less than  140/90       minocycline 100 MG tablet    DYNACIN    28 tablet    Take 1 tablet (100 mg) by mouth daily    Acneiform rash       mometasone 0.1 % cream    ELOCON    45 g    APPLY 1 GRAM TOPICALLY DAILY    Perioral dermatitis       MULTIVITAMIN PO      1 TABLET DAILY        nystatin cream    MYCOSTATIN          order for DME     1 Units    Equipment being ordered: Home Glucometer    Type 2 diabetes, HbA1c goal < 7% (H)       order for DME     400 strip    Equipment being ordered: Glucometer test strips for three times daily testing--fill with appropriate brand for the new glucometer    Type 2 diabetes, HbA1c goal < 7% (H)       osimertinib 80 MG tablet    TAGRISSO     Take 80 mg by mouth daily        pioglitazone 30 MG tablet    ACTOS    90 tablet    Take 1 tablet (30 mg) by mouth daily    Type 2 diabetes mellitus with hyperglycemia, without long-term current use of insulin (H)       polyethylene glycol 0.4%- propylene glycol 0.3% 0.4-0.3 % Soln ophthalmic solution    SYSTANE ULTRA     Place 1 drop into both eyes 3 times daily    Dry eyes, Pinguecula of both eyes       potassium chloride 10 MEQ tablet    K-TAB,KLOR-CON    90 tablet    TAKE ONE TABLET BY MOUTH ONE TIME DAILY    Essential hypertension with goal blood pressure less than 140/90       prochlorperazine 10 MG tablet    COMPAZINE          sulfacetamide 10 % ophthalmic solution    BLEPH-10     Apply 1 drop to eye every 2 hours (while awake)        tamsulosin 0.4 MG capsule    FLOMAX    90 capsule    TAKE ONE CAPSULE BY MOUTH ONE TIME DAILY    Enlarged prostate with lower urinary tract symptoms (LUTS)

## 2018-10-01 NOTE — TELEPHONE ENCOUNTER
"Requested Prescriptions   Pending Prescriptions Disp Refills     pioglitazone (ACTOS) 30 MG tablet [Pharmacy Med Name: Pioglitazone HCl Oral Tablet 30 MG] 10 tablet 0     Sig: TAKE ONE TABLET BY MOUTH ONE TIME DAILY    Thiazolidinedione Agents (TZDs)  Passed    9/29/2018  9:51 AM       Passed - Blood pressure less than 140/90 in past 6 months    BP Readings from Last 3 Encounters:   09/14/18 118/70   08/30/18 128/65   08/07/18 124/66          Passed - Patient has documented LDL within the past 12 mos.    Recent Labs   Lab Test  03/12/18   1223   LDL  43          Passed - Patient has a normal ALT within the past 12 mos.    Recent Labs   Lab Test  08/07/18   1209   ALT  25          Passed - Patient has a normal AST within the past 12 mos.     Recent Labs   Lab Test  08/07/18   1209   AST  13          Passed - Patient has had a Microalbumin in the past 12 mos.    Recent Labs   Lab Test  08/07/18   1214   MICROL  138   UMALCR  46.62*          Passed - Patient has documented A1c within the specified period of time.    If HgbA1C is 8 or greater, it needs to be on file within the past 3 months.  If less than 8, must be on file within the past 6 months.     Recent Labs   Lab Test  08/07/18   1209   A1C  5.7*            Passed - Diagnosis not CHF       Passed - Patient is age 18 or older       Passed - Patient has a normal serum Creatinine in the past 12 months    Recent Labs   Lab Test  08/07/18   1209   CR  1.18          Passed - Recent (6 mo) or future (30 days) visit within the authorizing provider's specialty    Patient had office visit in the last 6 months or has a visit in the next 30 days with authorizing provider or within the authorizing provider's specialty.  See \"Patient Info\" tab in inbasket, or \"Choose Columns\" in Meds & Orders section of the refill encounter.            tamsulosin (FLOMAX) 0.4 MG capsule [Pharmacy Med Name: Tamsulosin HCl Oral Capsule 0.4 MG] 90 capsule 2     Sig: TAKE ONE CAPSULE BY MOUTH ONE " "TIME DAILY    Alpha Blockers Passed    9/29/2018  9:51 AM       Passed - Blood pressure under 140/90 in past 12 months    BP Readings from Last 3 Encounters:   09/14/18 118/70   08/30/18 128/65   08/07/18 124/66          Passed - Recent (12 mo) or future (30 days) visit within the authorizing provider's specialty    Patient had office visit in the last 12 months or has a visit in the next 30 days with authorizing provider or within the authorizing provider's specialty.  See \"Patient Info\" tab in inbasket, or \"Choose Columns\" in Meds & Orders section of the refill encounter.           Passed - Patient does not have Tadalafil, Vardenafil, or Sildenafil on their medication list       Passed - Patient is 18 years of age or older        pioglitazone (ACTOS) 30 MG tablet  Prescription approved per [facility] Refill Protocol.  Per LOV to follow up in 3-4 months    tamsulosin (FLOMAX) 0.4 MG capsule  Prescription approved per [facility] Refill Protocol.    Frannie Hutchins, RN, BSN     "

## 2018-10-23 NOTE — PROGRESS NOTES
"  SUBJECTIVE:   Tolu Villeda is a 63 year old male who presents to clinic today for the following health issues:    History of Present Illness     Diet:  Low salt and Low fat/cholesterol  Frequency of exercise:  None  Taking medications regularly:  Yes  Medication side effects:  Other    Back Pain     Duration:   Approx 1 wk          Specific cause: lifting -  Maybe yard work, weed whacking where his right side was sore so would take 30 second breaks. He notes that at that time he also started doing 25 push ups daily all at once, not tapering up on amount. He did have \"muscle popping\" in his 30s, but has been taking \"vitamins\" for it. However has been experiencing muscle popping in his back mostly, but also his arms for the past 3 weeks.     He notes that with the weather change he was opening the heating vents and stumbled a bit, but did not experience any pain.     Description:   Location of pain: low back bilateral, middle of back and shoulders right  Character of pain: dull ache  Pain radiation: right shoulder to midsection of back and lower  New numbness or weakness in legs, not attributed to pain:  no     Intensity: Currently 2/10, mild, moderate    History:   Pain interferes with job: No,  But it is annoying  History of back problems: Yes  Any previous MRI or X-rays: None  Sees a specialist for back pain:  No  Therapies tried without relief: massage, opioids, rest, sitting and standing    Alleviating factors:   Improved by: Some of these things have taken the edge off      Precipitating factors:  Worsened by: Lifting, Bending, Standing and Sitting    Accompanying Signs & Symptoms:  Risk of Fracture:  None  Risk of Cauda Equina:  None  Risk of Infection:  None  Risk of Cancer:  History of cancer  Risk of Ankylosing Spondylitis:  Onset at age <35, male, AND morning back stiffness. no     Answers for HPI/ROS submitted by the patient on 10/26/2018   PHQ-2 Score: 0    Problem list and histories reviewed & " adjusted, as indicated.  Additional history: as documented    He has scar tissue in his back and flanks where he will randomly experience short, sharp bursts of pain that he has every couple months. He notes that he has hydrocodone at home for flare ups.     He states that his joints will intermittently ache and so is wondering if he needs to be screened for RA. No family history of RA. Denies morning stiffness or joint swelling.     Denies chest pain or shortness of breath.     He reports that he smoked a pack a day for 50 years. He is followed by oncology where he has PET scans every 3 months to screen for lung cancer. His next scan is scheduled for the end of November.     He continues to date long distance and reports that things are going well.     Patient Active Problem List   Diagnosis     Hyperlipidemia with target LDL less than 100     Smoking-quit in 8/12     GERD (gastroesophageal reflux disease)     ED (erectile dysfunction)     BMI 34.0-34.9,adult     Advanced directives, counseling/discussion     Microalbuminuria     Adenocarcinoma of lung (H)     Insomnia     Acneiform rash     Elevated prostate specific antigen (PSA)     Low back pain     Malignant neoplasm of lower lobe of left lung (H)     Type 2 diabetes mellitus with hyperglycemia, without long-term current use of insulin (H)     Essential hypertension with goal blood pressure less than 140/90     Health Care Home     Past Surgical History:   Procedure Laterality Date     COLONOSCOPY  12/04/2014    [facility]     SURGICAL HISTORY OF -   4/2009    Left testicular surgery with benign growth/probable hydrocele       Social History   Substance Use Topics     Smoking status: Former Smoker     Packs/day: 1.00     Years: 50.00     Types: Cigarettes     Quit date: 8/1/2013     Smokeless tobacco: Never Used     Alcohol use Yes      Comment: occasionally     Family History   Problem Relation Age of Onset     Diabetes Mother      Hypertension Mother       Diabetes Father      Hypertension Father      Alcohol/Drug Father      Hypertension Maternal Grandmother      Cerebrovascular Disease Maternal Grandmother      Hypertension Maternal Grandfather      Alcohol/Drug Maternal Grandfather      Hypertension Paternal Grandmother      Hypertension Paternal Grandfather      Asthma No family hx of      C.A.D. No family hx of      Breast Cancer No family hx of      Cancer - colorectal No family hx of      Prostate Cancer No family hx of      Alzheimer Disease No family hx of      Allergies No family hx of      Anesthesia Reaction No family hx of      Arthritis No family hx of      Blood Disease No family hx of      Cancer No family hx of      Hearing Loss No family hx of      Family History Negative No family hx of      Thyroid Disease No family hx of      Respiratory No family hx of      Osteoporosis No family hx of      Obesity No family hx of      Neurologic Disorder No family hx of      Psychotic Disorder No family hx of      Musculoskeletal Disorder No family hx of      Lipids No family hx of      HEART DISEASE No family hx of      Gynecology No family hx of      Genitourinary Problems No family hx of      Genetic Disorder No family hx of      GASTROINTESTINAL DISEASE No family hx of      Eye Disorder No family hx of      Endocrine Disease No family hx of      Depression No family hx of      Connective Tissue Disorder No family hx of      Congenital Anomalies No family hx of      Circulatory No family hx of      Cardiovascular No family hx of      KIDNEY DISEASE No family hx of      Anxiety Disorder No family hx of      Mental Illness No family hx of      Substance Abuse No family hx of      Other Cancer No family hx of      Colon Cancer No family hx of      Hyperlipidemia No family hx of      Glaucoma No family hx of      Macular Degeneration No family hx of          Current Outpatient Prescriptions   Medication Sig Dispense Refill     amLODIPine (NORVASC) 10 MG  tablet Take 1 tablet (10 mg) by mouth daily 90 tablet 1     atorvastatin (LIPITOR) 40 MG tablet Take 1 tablet (40 mg) by mouth daily 90 tablet 3     carvedilol (COREG) 25 MG tablet TAKE ONE TABLET BY MOUTH TWICE DAILY with meals 180 tablet 1     clobetasol propionate (CLOBEX) 0.05 % SHAM Externally apply 1 Bottle topically daily Apply to scalp prior to showering.  Leave on for 15 min 118 mL 1     cyclobenzaprine (FLEXERIL) 5 MG tablet Take 1 tablet (5 mg) by mouth daily as needed for muscle spasms 20 tablet 0     famotidine (PEPCID) 40 MG tablet TAKE 1 TABLET (40 MG) BY MOUTH AT BEDTIME 90 tablet 3     glucose blood VI test strips (ASCENSIA MICROFILL TEST) strip 3 times daily. 200 strip 5     HYDROcodone-acetaminophen (NORCO) 5-325 MG per tablet Take 1 tablet by mouth every 4 hours as needed for moderate to severe pain       linagliptin-metFORMIN (JENTADUETO) 2.5-1000 MG per tablet TAKE ONE TABLET BY MOUTH TWICE DAILY with food 180 tablet 1     lisinopril (PRINIVIL/ZESTRIL) 40 MG tablet Take 1 tablet (40 mg) by mouth 2 times daily 180 tablet 1     minocycline (DYNACIN) 100 MG tablet Take 1 tablet (100 mg) by mouth daily 28 tablet 1     mometasone (ELOCON) 0.1 % cream APPLY 1 GRAM TOPICALLY DAILY 45 g 1     MULTIVITAMIN OR 1 TABLET DAILY       osimertinib (TAGRISSO) 80 MG tablet Take 80 mg by mouth daily       pioglitazone (ACTOS) 30 MG tablet TAKE ONE TABLET BY MOUTH ONE TIME DAILY 90 tablet 0     potassium chloride (K-TAB,KLOR-CON) 10 MEQ tablet TAKE ONE TABLET BY MOUTH ONE TIME DAILY 90 tablet 3     tamsulosin (FLOMAX) 0.4 MG capsule TAKE ONE CAPSULE BY MOUTH ONE TIME DAILY 90 capsule 0     ASPIRIN NOT PRESCRIBED (INTENTIONAL) 1 each continuous prn Antiplatelet medication not prescribed intentionally due to oncology therapy. 0 each 0     clindamycin (CLINDAMAX) 1 % gel Use on face 2 times a day 30 g 3     diphenoxylate-atropine (LOMOTIL) 2.5-0.025 MG per tablet Take 1 tablet by mouth 4 times daily as needed for  diarrhea 40 tablet 3     Emollient (AQUAPHOR ADVANCED THERAPY) OINT Externally apply 1 dose topically 2 times daily 30 g 2     fluocinonide (LIDEX) 0.05 % cream        nystatin (MYCOSTATIN) cream   0     ORDER FOR DME Equipment being ordered: Glucometer test strips for three times daily testing--fill with appropriate brand for the new glucometer 400 strip 3     ORDER FOR DME Equipment being ordered: Home Glucometer 1 Units 1     polyethylene glycol 0.4%- propylene glycol 0.3% (SYSTANE ULTRA) 0.4-0.3 % SOLN ophthalmic solution Place 1 drop into both eyes 3 times daily       prochlorperazine (COMPAZINE) 10 MG tablet        Allergies   Allergen Reactions     Aspirin      Recent Labs   Lab Test  08/07/18   1209  03/12/18   1223  10/24/17   1134  07/03/17   1209  11/21/16   1044   01/19/16   1210   A1C  5.7*  5.6  5.5  5.7  5.7   < >  5.9   LDL   --   43   --   43  51   < >  32   HDL   --   64   --   55  49   < >  51   TRIG   --   72   --   100  107   < >  103   ALT  25  22   --   23  22   --   16   CR  1.18  1.16  1.07  1.09  1.02   --   1.14   GFRESTIMATED  62  63  69  68  74   --   65   GFRESTBLACK  75  76  84  82  89   --   79   POTASSIUM  5.1  4.5  4.7  4.5  4.7   --   4.5   TSH   --   3.08   --    --    --    --   3.51    < > = values in this interval not displayed.      BP Readings from Last 3 Encounters:   10/26/18 106/48   09/14/18 118/70   08/30/18 128/65    Wt Readings from Last 3 Encounters:   10/26/18 198 lb (89.8 kg)   09/14/18 196 lb 4.8 oz (89 kg)   08/30/18 195 lb (88.5 kg)        ROS:  Constitutional, HEENT, cardiovascular, pulmonary, GI, , musculoskeletal, neuro, skin, endocrine and psych systems are negative, except as otherwise noted.    This document serves as a record of the services and decisions personally performed and made by Alyx Hahn CNP. It was created on his/her behalf by Gena Guadalupe, trained medical scribe. The creation of this document is based the provider's statements to the  medical scribes.    Yumiko Gena Guadalupe 11:10 AM, October 26, 2018  OBJECTIVE:     /48  Pulse 84  Temp 98.5  F (36.9  C)  Wt 198 lb (89.8 kg)  SpO2 98%  BMI 30.55 kg/m2  Body mass index is 30.55 kg/(m^2).     GENERAL APPEARANCE: healthy, alert and no distress  RESP: lungs clear to auscultation - no rales, rhonchi or wheezes  CV: regular rates and rhythm, normal S1 S2, no S3 or S4 and no murmur, click or rub  MS: extremities normal- no gross deformities noted and ROM is good- weakened abduciton on bilat upper arms- likely chronic rotator cuff syndrome, but not cause of pain. No vertebral tenderness ,strength 5/5 of UE with push/pull  SKIN: no suspicious lesions or rashes  Neuro: no gross deficits, normal sensation of LE,       Diagnostic Test Results:  No results found for this or any previous visit (from the past 24 hour(s)).    ASSESSMENT/PLAN:       ICD-10-CM    1. Upper back pain on right side M54.9 cyclobenzaprine (FLEXERIL) 5 MG tablet   2. Adenocarcinoma of left lung (H) C34.92    3. Personal history of tobacco use Z87.891      Reviewed symptoms and etiology patient present with today. Advised starting Flexeril 5 mg; Rx provided. Reviewed directions, benefits, and side effects of medication with patient today. Advised that he decrease his amount of activity while his muscles recover and then ease back into more strenuous activity. Discussed stretches patient can do at home for relief and increase water intake. Discussed that if he does not improve with stretching, flexeril, and less activity he can call for a physical therapy referral.     Follow up with PCP if symptoms do not improve or worsen or PRN    The information in this document, created by the medical scribe for me, accurately reflects the services I personally performed and the decisions made by me. I have reviewed and approved this document for accuracy prior to leaving the patient care area.  Alyx Hahn CNP  11:10 AM, 10/26/18    Alyx  [facility]ELLA [facility] MARY

## 2018-10-26 NOTE — MR AVS SNAPSHOT
After Visit Summary   10/26/2018    Tolu Villeda    MRN: [id number]           Patient Information     Date Of Birth          1955        Visit Information        Provider Department      10/26/2018 11:00 AM Alyx Hahn APRN CNP [facility]        Today's Diagnoses     Upper back pain on right side    -  1    Adenocarcinoma of left lung (H)        Personal history of tobacco use           Follow-ups after your visit        Follow-up notes from your care team     Return in about 2 weeks (around 11/9/2018) for Recheck symptoms if not improving or worsening.      Who to contact     If you have questions or need follow up information about today's clinic visit or your schedule please contact [facility] directly at [phone].  Normal or non-critical lab and imaging results will be communicated to you by ADTZhart, letter or phone within 4 business days after the clinic has received the results. If you do not hear from us within 7 days, please contact the clinic through UniQure[employer] or phone. If you have a critical or abnormal lab result, we will notify you by phone as soon as possible.  Submit refill requests through [employer] or call your pharmacy and they will forward the refill request to us. Please allow 3 business days for your refill to be completed.          Additional Information About Your Visit        MyChart Information     [employer] gives you secure access to your electronic health record. If you see a primary care provider, you can also send messages to your care team and make appointments. If you have questions, please call your primary care clinic.  If you do not have a primary care provider, please call [phone] and they will assist you.        Care EveryWhere ID     This is your Care EveryWhere ID. This could be used by other organizations to access your [place] medical records  [id number]        Your Vitals Were     Pulse Temperature Pulse Oximetry BMI  (Body Mass Index)          84 98.5  F (36.9  C) 98% 30.55 kg/m2         Blood Pressure from Last 3 Encounters:   10/26/18 106/48   09/14/18 118/70   08/30/18 128/65    Weight from Last 3 Encounters:   10/26/18 198 lb (89.8 kg)   09/14/18 196 lb 4.8 oz (89 kg)   08/30/18 195 lb (88.5 kg)              We Performed the Following     Prof Fee: Shared Decision Making Visit for Lung Cancer Screening          Today's Medication Changes          These changes are accurate as of 10/26/18  5:19 PM.  If you have any questions, ask your nurse or doctor.               Start taking these medicines.        Dose/Directions    cyclobenzaprine 5 MG tablet   Commonly known as:  FLEXERIL   Used for:  Upper back pain on right side   Started by:  Alyx Hahn APRN CNP        Dose:  5 mg   Take 1 tablet (5 mg) by mouth daily as needed for muscle spasms   Quantity:  20 tablet   Refills:  0            Where to get your medicines      These medications were sent to [id number] - [place] - [address] [place]  [address] [place] [address]    Hours:  test fax sent successfully 7/31/03  Phone:  [phone]     cyclobenzaprine 5 MG tablet                Primary Care Provider Office Phone # Fax #    Javed Maier [phone] [address] [facility] [address]        Equal Access to Services     [facility]: Hadii caitlin ku hadasho Soomaali, waaxda luqadaha, qaybta kaalmada adeegyada, waxay viv elliott . So [facility] [phone].    ATENCIÓN: Si habla español, tiene a ibanez disposición servicios gratuitos de asistencia lingüística. Llame al [phone].    We comply with applicable federal civil rights laws and [place] laws. We do not discriminate on the basis of race, color, national origin, age, disability, sex, sexual orientation, or gender identity.            Thank you!     Thank you for choosing [facility]  for your care. Our goal is  always to provide you with excellent care. Hearing back from our patients is one way we can continue to improve our services. Please take a few minutes to complete the written survey that you may receive in the mail after your visit with us. Thank you!             Your Updated Medication List - Protect others around you: Learn how to safely use, store and throw away your medicines at www.disposemymeds.org.          This list is accurate as of 10/26/18  5:19 PM.  Always use your most recent med list.                   Brand Name Dispense Instructions for use Diagnosis    amLODIPine 10 MG tablet    NORVASC    90 tablet    Take 1 tablet (10 mg) by mouth daily    Essential hypertension with goal blood pressure less than 140/90       AQUAPHOR ADVANCED THERAPY Oint     30 g    Externally apply 1 dose topically 2 times daily    Adenocarcinoma of lung, left (H), Acneiform rash       ASPIRIN NOT PRESCRIBED    INTENTIONAL    0 each    1 each continuous prn Antiplatelet medication not prescribed intentionally due to oncology therapy.    Type 2 diabetes mellitus with hyperglycemia, without long-term current use of insulin (H)       atorvastatin 40 MG tablet    LIPITOR    90 tablet    Take 1 tablet (40 mg) by mouth daily    Hyperlipidemia with target LDL less than 100       blood glucose monitoring test strip    DEISY CONTOUR    200 strip    3 times daily.    Type 2 diabetes, HbA1c goal < 7% (H)       carvedilol 25 MG tablet    COREG    180 tablet    TAKE ONE TABLET BY MOUTH TWICE DAILY with meals    Essential hypertension with goal blood pressure less than 140/90       clindamycin 1 % topical gel    CLINDAMAX    30 g    Use on face 2 times a day    Acne       clobetasol propionate 0.05 % Sham    CLOBEX    118 mL    Externally apply 1 Bottle topically daily Apply to scalp prior to showering.  Leave on for 15 min    Dry scalp, Adenocarcinoma of lung, unspecified laterality (H)       cyclobenzaprine 5 MG tablet    FLEXERIL    20  tablet    Take 1 tablet (5 mg) by mouth daily as needed for muscle spasms    Upper back pain on right side       diphenoxylate-atropine 2.5-0.025 MG per tablet    LOMOTIL    40 tablet    Take 1 tablet by mouth 4 times daily as needed for diarrhea    Acute diarrhea       famotidine 40 MG tablet    PEPCID    90 tablet    TAKE 1 TABLET (40 MG) BY MOUTH AT BEDTIME    Dyspepsia and disorder of function of stomach       fluocinonide 0.05 % cream    LIDEX          HYDROcodone-acetaminophen 5-325 MG per tablet    NORCO     Take 1 tablet by mouth every 4 hours as needed for moderate to severe pain        linagliptin-metFORMIN 2.5-1000 MG per tablet    JENTADUETO    180 tablet    TAKE ONE TABLET BY MOUTH TWICE DAILY with food    Type 2 diabetes mellitus with hyperglycemia, without long-term current use of insulin (H)       lisinopril 40 MG tablet    PRINIVIL/ZESTRIL    180 tablet    Take 1 tablet (40 mg) by mouth 2 times daily    Essential hypertension with goal blood pressure less than 140/90       minocycline 100 MG tablet    DYNACIN    28 tablet    Take 1 tablet (100 mg) by mouth daily    Acneiform rash       mometasone 0.1 % cream    ELOCON    45 g    APPLY 1 GRAM TOPICALLY DAILY    Perioral dermatitis       MULTIVITAMIN PO      1 TABLET DAILY        nystatin cream    MYCOSTATIN          order for DME     1 Units    Equipment being ordered: Home Glucometer    Type 2 diabetes, HbA1c goal < 7% (H)       order for DME     400 strip    Equipment being ordered: Glucometer test strips for three times daily testing--fill with appropriate brand for the new glucometer    Type 2 diabetes, HbA1c goal < 7% (H)       osimertinib 80 MG tablet    TAGRISSO     Take 80 mg by mouth daily        pioglitazone 30 MG tablet    ACTOS    90 tablet    TAKE ONE TABLET BY MOUTH ONE TIME DAILY    Type 2 diabetes mellitus with hyperglycemia, without long-term current use of insulin (H)       polyethylene glycol 0.4%- propylene glycol 0.3% 0.4-0.3 %  Soln ophthalmic solution    SYSTANE ULTRA     Place 1 drop into both eyes 3 times daily    Dry eyes, Pinguecula of both eyes       potassium chloride 10 MEQ tablet    K-TAB,KLOR-CON    90 tablet    TAKE ONE TABLET BY MOUTH ONE TIME DAILY    Essential hypertension with goal blood pressure less than 140/90       prochlorperazine 10 MG tablet    COMPAZINE          tamsulosin 0.4 MG capsule    FLOMAX    90 capsule    TAKE ONE CAPSULE BY MOUTH ONE TIME DAILY    Enlarged prostate with lower urinary tract symptoms (LUTS)

## 2018-10-26 NOTE — PROGRESS NOTES
Lung Cancer Screening Shared Decision Making Visit     Tolu Villeda is eligible for lung cancer screening on the basis of the information provided in my signed lung cancer screening order.     I have discussed with patient the risks and benefits of screening for lung cancer with low-dose CT.     The risks include:  radiation exposure: one low dose chest CT has as much ionizing radiation as about 15 chest x-rays or 6 months of background radiation living in [place]    false positives: 96% of positive findings/nodules are NOT cancer, but some might still require additional diagnostic evaluation, including biopsy  over-diagnosis: some slow growing cancers that might never have been clinically significant will be detected and treated unnecessarily     The benefit of early detection of lung cancer is contingent upon adherence to annual screening or more frequent follow up if indicated.     Furthermore, reaping the benefits of screening requires Tolu Villeda to be willing and physically able to undergo diagnostic procedures, if indicated. Although no specific guide is available for determining severity of comorbidities, it is reasonable to withhold screening in patients who have greater mortality risk from other diseases.     We did discuss that the only way to prevent lung cancer is to not smoke. Smoking cessation assistance was not offered.    I did not offer risk estimation using a calculator such as this one:      Current Lung cancer diagnosis- serial imaging is occurring through Oncology.     ShouldIScreen

## 2018-10-31 NOTE — PROGRESS NOTES
[facility] Care Coordination Contact    Email notification received that member's pcp has resigned as of 11/9/18. I called member to assist with finding a new pcp. Member is interested in staying at [facility] but, has concerns that there is no MD taking his place. He would like me to mail him information pertaining to new MD coming to [place] or other options in the [place] system. We discussed switching from [facility] to [facility]. He will think about that and call [facility] if he decides to do that. I will call him back in Jan. To schedule annual home assessment.     Awa Leiva RN  [facility]  [phone]

## 2018-11-15 NOTE — PROGRESS NOTES
HISTORY OF PRESENT ILLNESS  Mr. Villeda is a pleasant 63 year old year old male who presents to clinic today with neck and shoulder pain right  Tolu explains that this has occurred for over a month  Location: neck and right shoulder  Quality:  achy pain    Severity: 6/10 at worst    Timing: occurs intermittently  Context: occurs while using his arm  Modifying factors:  resting and non-use makes it better, movement and use makes it worse  Associated signs & symptoms: radiation of pain into arm  Previous similar pain: no  Additional history: as documented    MEDICAL HISTORY  Patient Active Problem List   Diagnosis     Hyperlipidemia with target LDL less than 100     Smoking-quit in 8/12     GERD (gastroesophageal reflux disease)     ED (erectile dysfunction)     BMI 34.0-34.9,adult     Advanced directives, counseling/discussion     Microalbuminuria     Adenocarcinoma of lung (H)     Insomnia     Acneiform rash     Elevated prostate specific antigen (PSA)     Low back pain     Malignant neoplasm of lower lobe of left lung (H)     Type 2 diabetes mellitus with hyperglycemia, without long-term current use of insulin (H)     Essential hypertension with goal blood pressure less than 140/90     Health Care Home       Current Outpatient Prescriptions   Medication Sig Dispense Refill     amLODIPine (NORVASC) 10 MG tablet Take 1 tablet (10 mg) by mouth daily 90 tablet 1     ASPIRIN NOT PRESCRIBED (INTENTIONAL) 1 each continuous prn Antiplatelet medication not prescribed intentionally due to oncology therapy. 0 each 0     atorvastatin (LIPITOR) 40 MG tablet Take 1 tablet (40 mg) by mouth daily 90 tablet 3     carvedilol (COREG) 25 MG tablet TAKE ONE TABLET BY MOUTH TWICE DAILY with meals 180 tablet 1     clindamycin (CLINDAMAX) 1 % gel Use on face 2 times a day 30 g 3     clobetasol propionate (CLOBEX) 0.05 % SHAM Externally apply 1 Bottle topically daily Apply to scalp prior to showering.  Leave on for 15 min 118 mL 1      cyclobenzaprine (FLEXERIL) 5 MG tablet Take 1 tablet (5 mg) by mouth daily as needed for muscle spasms 20 tablet 0     diphenoxylate-atropine (LOMOTIL) 2.5-0.025 MG per tablet Take 1 tablet by mouth 4 times daily as needed for diarrhea 40 tablet 3     Emollient (AQUAPHOR ADVANCED THERAPY) OINT Externally apply 1 dose topically 2 times daily 30 g 2     famotidine (PEPCID) 40 MG tablet TAKE 1 TABLET (40 MG) BY MOUTH AT BEDTIME 90 tablet 3     fluocinonide (LIDEX) 0.05 % cream        glucose blood VI test strips (ASCENSIA MICROFILL TEST) strip 3 times daily. 200 strip 5     HYDROcodone-acetaminophen (NORCO) 5-325 MG per tablet Take 1 tablet by mouth every 4 hours as needed for moderate to severe pain       linagliptin-metFORMIN (JENTADUETO) 2.5-1000 MG per tablet TAKE ONE TABLET BY MOUTH TWICE DAILY with food 180 tablet 1     lisinopril (PRINIVIL/ZESTRIL) 40 MG tablet Take 1 tablet (40 mg) by mouth 2 times daily 180 tablet 1     minocycline (DYNACIN) 100 MG tablet Take 1 tablet (100 mg) by mouth daily 28 tablet 1     mometasone (ELOCON) 0.1 % cream APPLY 1 GRAM TOPICALLY DAILY 45 g 1     MULTIVITAMIN OR 1 TABLET DAILY       nystatin (MYCOSTATIN) cream   0     ORDER FOR DME Equipment being ordered: Glucometer test strips for three times daily testing--fill with appropriate brand for the new glucometer 400 strip 3     ORDER FOR DME Equipment being ordered: Home Glucometer 1 Units 1     osimertinib (TAGRISSO) 80 MG tablet Take 80 mg by mouth daily       pioglitazone (ACTOS) 30 MG tablet TAKE ONE TABLET BY MOUTH ONE TIME DAILY 90 tablet 0     polyethylene glycol 0.4%- propylene glycol 0.3% (SYSTANE ULTRA) 0.4-0.3 % SOLN ophthalmic solution Place 1 drop into both eyes 3 times daily       potassium chloride (K-TAB,KLOR-CON) 10 MEQ tablet TAKE ONE TABLET BY MOUTH ONE TIME DAILY 90 tablet 3     prochlorperazine (COMPAZINE) 10 MG tablet        tamsulosin (FLOMAX) 0.4 MG capsule TAKE ONE CAPSULE BY MOUTH ONE TIME DAILY 90  capsule 0       Allergies   Allergen Reactions     Aspirin        Family History   Problem Relation Age of Onset     Diabetes Mother      Hypertension Mother      Diabetes Father      Hypertension Father      Alcohol/Drug Father      Hypertension Maternal Grandmother      Cerebrovascular Disease Maternal Grandmother      Hypertension Maternal Grandfather      Alcohol/Drug Maternal Grandfather      Hypertension Paternal Grandmother      Hypertension Paternal Grandfather      Asthma No family hx of      C.A.D. No family hx of      Breast Cancer No family hx of      Cancer - colorectal No family hx of      Prostate Cancer No family hx of      Alzheimer Disease No family hx of      Allergies No family hx of      Anesthesia Reaction No family hx of      Arthritis No family hx of      Blood Disease No family hx of      Cancer No family hx of      Hearing Loss No family hx of      Family History Negative No family hx of      Thyroid Disease No family hx of      Respiratory No family hx of      Osteoporosis No family hx of      Obesity No family hx of      Neurologic Disorder No family hx of      Psychotic Disorder No family hx of      Musculoskeletal Disorder No family hx of      Lipids No family hx of      HEART DISEASE No family hx of      Gynecology No family hx of      Genitourinary Problems No family hx of      Genetic Disorder No family hx of      GASTROINTESTINAL DISEASE No family hx of      Eye Disorder No family hx of      Endocrine Disease No family hx of      Depression No family hx of      Connective Tissue Disorder No family hx of      Congenital Anomalies No family hx of      Circulatory No family hx of      Cardiovascular No family hx of      KIDNEY DISEASE No family hx of      Anxiety Disorder No family hx of      Mental Illness No family hx of      Substance Abuse No family hx of      Other Cancer No family hx of      Colon Cancer No family hx of      Hyperlipidemia No family hx of      Glaucoma No family  hx of      Macular Degeneration No family hx of        Additional medical/Social/Surgical histories reviewed in [facility] and updated as appropriate.     REVIEW OF SYSTEMS (11/15/2018)  10 point ROS of systems including Constitutional, Eyes, Respiratory, Cardiovascular, Gastroenterology, Genitourinary, Integumentary, Musculoskeletal, Psychiatric were all negative except for pertinent positives noted in my HPI.     PHYSICAL EXAM  Vitals:    11/15/18 1217   BP: 132/72   Pulse: 86   SpO2: 94%     Vital Signs: /72   Pulse 86   SpO2 94%  Patient declined being weighed. There is no height or weight on file to calculate BMI.    General  - normal appearance, in no obvious distress  CV  - normal radial pulse  Pulm  - normal respiratory pattern, non-labored  Musculoskeletal - neck and right shoulder  - inspection: normal bone and joint alignment, no obvious deformity, no scapular winging, no AC step-off  - palpation: mildly tender RC insertion, normal clavicle, non-tender AC  - ROM:  painful and limited flexion and ER at end range, limited IR of right shoulder and neck, with flexion and extension  - strength: 5/5  strength, 5/5 in all shoulder planes  - special tests:  (-) Speed's  (+) Neer  (+) Hawkin's  (+) Mehdi's  (-) [place]'s  (-) apprehension  (-) subscap lift-off  Positive spurlings on right  Neuro  - no sensory or motor deficit, grossly normal coordination, normal muscle tone  Skin  - no ecchymosis, erythema, warmth, or induration, no obvious rash  Psych  - interactive, appropriate, normal mood and affect    ASSESSMENT & PLAN  62 yo male with neck and right shoulder pain due to cervical radicular pain  Reviewed c spine xrays: shows disc space narrowing  Ordered MRI  Consider refill for cyclobenzaprine  F/u after MRI      Horacio Mayen MD, CAQSM

## 2018-11-15 NOTE — LETTER
11/15/2018         RE: Tolu Villeda  [id number] [address] [facility] [address]        Dear Colleague,    Thank you for referring your patient, Tolu Villeda, to the [facility] [place]. Please see a copy of my visit note below.    HISTORY OF PRESENT ILLNESS  Mr. Villeda is a pleasant 63 year old year old male who presents to clinic today with neck and shoulder pain right  Tolu explains that this has occurred for over a month  Location: neck and right shoulder  Quality:  achy pain    Severity: 6/10 at worst    Timing: occurs intermittently  Context: occurs while using his arm  Modifying factors:  resting and non-use makes it better, movement and use makes it worse  Associated signs & symptoms: radiation of pain into arm  Previous similar pain: no  Additional history: as documented    MEDICAL HISTORY  Patient Active Problem List   Diagnosis     Hyperlipidemia with target LDL less than 100     Smoking-quit in 8/12     GERD (gastroesophageal reflux disease)     ED (erectile dysfunction)     BMI 34.0-34.9,adult     Advanced directives, counseling/discussion     Microalbuminuria     Adenocarcinoma of lung (H)     Insomnia     Acneiform rash     Elevated prostate specific antigen (PSA)     Low back pain     Malignant neoplasm of lower lobe of left lung (H)     Type 2 diabetes mellitus with hyperglycemia, without long-term current use of insulin (H)     Essential hypertension with goal blood pressure less than 140/90     Health Care Home       Current Outpatient Prescriptions   Medication Sig Dispense Refill     amLODIPine (NORVASC) 10 MG tablet Take 1 tablet (10 mg) by mouth daily 90 tablet 1     ASPIRIN NOT PRESCRIBED (INTENTIONAL) 1 each continuous prn Antiplatelet medication not prescribed intentionally due to oncology therapy. 0 each 0     atorvastatin (LIPITOR) 40 MG tablet Take 1 tablet (40 mg) by mouth daily 90 tablet 3     carvedilol (COREG) 25 MG tablet TAKE ONE TABLET BY MOUTH TWICE  DAILY with meals 180 tablet 1     clindamycin (CLINDAMAX) 1 % gel Use on face 2 times a day 30 g 3     clobetasol propionate (CLOBEX) 0.05 % SHAM Externally apply 1 Bottle topically daily Apply to scalp prior to showering.  Leave on for 15 min 118 mL 1     cyclobenzaprine (FLEXERIL) 5 MG tablet Take 1 tablet (5 mg) by mouth daily as needed for muscle spasms 20 tablet 0     diphenoxylate-atropine (LOMOTIL) 2.5-0.025 MG per tablet Take 1 tablet by mouth 4 times daily as needed for diarrhea 40 tablet 3     Emollient (AQUAPHOR ADVANCED THERAPY) OINT Externally apply 1 dose topically 2 times daily 30 g 2     famotidine (PEPCID) 40 MG tablet TAKE 1 TABLET (40 MG) BY MOUTH AT BEDTIME 90 tablet 3     fluocinonide (LIDEX) 0.05 % cream        glucose blood VI test strips (ASCENSIA MICROFILL TEST) strip 3 times daily. 200 strip 5     HYDROcodone-acetaminophen (NORCO) 5-325 MG per tablet Take 1 tablet by mouth every 4 hours as needed for moderate to severe pain       linagliptin-metFORMIN (JENTADUETO) 2.5-1000 MG per tablet TAKE ONE TABLET BY MOUTH TWICE DAILY with food 180 tablet 1     lisinopril (PRINIVIL/ZESTRIL) 40 MG tablet Take 1 tablet (40 mg) by mouth 2 times daily 180 tablet 1     minocycline (DYNACIN) 100 MG tablet Take 1 tablet (100 mg) by mouth daily 28 tablet 1     mometasone (ELOCON) 0.1 % cream APPLY 1 GRAM TOPICALLY DAILY 45 g 1     MULTIVITAMIN OR 1 TABLET DAILY       nystatin (MYCOSTATIN) cream   0     ORDER FOR DME Equipment being ordered: Glucometer test strips for three times daily testing--fill with appropriate brand for the new glucometer 400 strip 3     ORDER FOR DME Equipment being ordered: Home Glucometer 1 Units 1     osimertinib (TAGRISSO) 80 MG tablet Take 80 mg by mouth daily       pioglitazone (ACTOS) 30 MG tablet TAKE ONE TABLET BY MOUTH ONE TIME DAILY 90 tablet 0     polyethylene glycol 0.4%- propylene glycol 0.3% (SYSTANE ULTRA) 0.4-0.3 % SOLN ophthalmic solution Place 1 drop into both eyes 3  times daily       potassium chloride (K-TAB,KLOR-CON) 10 MEQ tablet TAKE ONE TABLET BY MOUTH ONE TIME DAILY 90 tablet 3     prochlorperazine (COMPAZINE) 10 MG tablet        tamsulosin (FLOMAX) 0.4 MG capsule TAKE ONE CAPSULE BY MOUTH ONE TIME DAILY 90 capsule 0       Allergies   Allergen Reactions     Aspirin        Family History   Problem Relation Age of Onset     Diabetes Mother      Hypertension Mother      Diabetes Father      Hypertension Father      Alcohol/Drug Father      Hypertension Maternal Grandmother      Cerebrovascular Disease Maternal Grandmother      Hypertension Maternal Grandfather      Alcohol/Drug Maternal Grandfather      Hypertension Paternal Grandmother      Hypertension Paternal Grandfather      Asthma No family hx of      C.A.D. No family hx of      Breast Cancer No family hx of      Cancer - colorectal No family hx of      Prostate Cancer No family hx of      Alzheimer Disease No family hx of      Allergies No family hx of      Anesthesia Reaction No family hx of      Arthritis No family hx of      Blood Disease No family hx of      Cancer No family hx of      Hearing Loss No family hx of      Family History Negative No family hx of      Thyroid Disease No family hx of      Respiratory No family hx of      Osteoporosis No family hx of      Obesity No family hx of      Neurologic Disorder No family hx of      Psychotic Disorder No family hx of      Musculoskeletal Disorder No family hx of      Lipids No family hx of      HEART DISEASE No family hx of      Gynecology No family hx of      Genitourinary Problems No family hx of      Genetic Disorder No family hx of      GASTROINTESTINAL DISEASE No family hx of      Eye Disorder No family hx of      Endocrine Disease No family hx of      Depression No family hx of      Connective Tissue Disorder No family hx of      Congenital Anomalies No family hx of      Circulatory No family hx of      Cardiovascular No family hx of      KIDNEY DISEASE No  family hx of      Anxiety Disorder No family hx of      Mental Illness No family hx of      Substance Abuse No family hx of      Other Cancer No family hx of      Colon Cancer No family hx of      Hyperlipidemia No family hx of      Glaucoma No family hx of      Macular Degeneration No family hx of        Additional medical/Social/Surgical histories reviewed in [facility] and updated as appropriate.     REVIEW OF SYSTEMS (11/15/2018)  10 point ROS of systems including Constitutional, Eyes, Respiratory, Cardiovascular, Gastroenterology, Genitourinary, Integumentary, Musculoskeletal, Psychiatric were all negative except for pertinent positives noted in my HPI.     PHYSICAL EXAM  Vitals:    11/15/18 1217   BP: 132/72   Pulse: 86   SpO2: 94%     Vital Signs: /72   Pulse 86   SpO2 94%  Patient declined being weighed. There is no height or weight on file to calculate BMI.    General  - normal appearance, in no obvious distress  CV  - normal radial pulse  Pulm  - normal respiratory pattern, non-labored  Musculoskeletal - neck and right shoulder  - inspection: normal bone and joint alignment, no obvious deformity, no scapular winging, no AC step-off  - palpation: mildly tender RC insertion, normal clavicle, non-tender AC  - ROM:  painful and limited flexion and ER at end range, limited IR of right shoulder and neck, with flexion and extension  - strength: 5/5  strength, 5/5 in all shoulder planes  - special tests:  (-) Speed's  (+) Neer  (+) Hawkin's  (+) Mehdi's  (-) [place]'s  (-) apprehension  (-) subscap lift-off  Positive spurlings on right  Neuro  - no sensory or motor deficit, grossly normal coordination, normal muscle tone  Skin  - no ecchymosis, erythema, warmth, or induration, no obvious rash  Psych  - interactive, appropriate, normal mood and affect    ASSESSMENT & PLAN  64 yo male with neck and right shoulder pain due to cervical radicular pain  Reviewed c spine xrays: shows disc space narrowing  Ordered  MRI  Consider refill for cyclobenzaprine  F/u after MRI      Horacio Mayen MD, [facility]    Again, thank you for allowing me to participate in the care of your patient.        Sincerely,        Horacio Mayen MD

## 2018-11-15 NOTE — NURSING NOTE
Tolu Villeda's chief complaint for this visit includes:  Chief Complaint   Patient presents with     Consult     Right shoulder blade pain X5 weeks. pt states lumbar and cervical pain     PCP: Javed Maier    Referring Provider:  No referring provider defined for this encounter.    /72  Pulse 86  SpO2 94%  Severe Pain (7)     Do you need any medication refills at today's visit? No

## 2018-11-15 NOTE — PATIENT INSTRUCTIONS
Thanks for coming today.  Ortho[facility] Medicine Clinic  [address] [place] [address]    To schedule future appointments in Ortho Clinic, you may call [phone].    To schedule ordered imaging by your provider:   Call Central Imaging [phone]    To schedule an injection ordered by your provider:  Call Central Imaging Injection scheduling line: [phone]  [facility] available online at:  [email or web address]    Please call if any further questions or concerns ([phone]).  Clinic hours 8 am to 5 pm.    Return to clinic (call) if symptoms worsen or fail to improve.

## 2018-11-21 NOTE — TELEPHONE ENCOUNTER
Requested imaged from [facility] to be pushed to [place]. Will wait on images and have Dr. Mayen review.

## 2018-11-21 NOTE — TELEPHONE ENCOUNTER
[facility] Call Center    Phone Message    May a detailed message be left on voicemail: yes    Reason for Call: Requesting Results   Name/type of test: MRI  Date of test: 11/15/18  Was test done at a location other than [facility] (Please fill in the location if not [facility])?: Yes: [facility]      Action Taken: Message routed to:  Adult Clinics: Sports Medicine p [id number]

## 2018-11-27 NOTE — TELEPHONE ENCOUNTER
Dr. mayen contacted patient and left message to return call. Dr. Mayen discussed ordering epidural injection

## 2018-11-27 NOTE — TELEPHONE ENCOUNTER
Pt returned phone call and would like a call back for clarification on the message left. Pt is wondering if the epidural injection will possibly be done on Thursday. Please advise.

## 2018-11-29 NOTE — PROGRESS NOTES
HISTORY OF PRESENT ILLNESS  Mr. Villeda is a pleasant 63 year old year old male who presents to clinic today with   followup for cervical MRI  Still feeling pain   Would like some medications and discuss injection  Patient Active Problem List   Diagnosis     Hyperlipidemia with target LDL less than 100     Smoking-quit in 8/12     GERD (gastroesophageal reflux disease)     ED (erectile dysfunction)     BMI 34.0-34.9,adult     Advanced directives, counseling/discussion     Microalbuminuria     Adenocarcinoma of lung (H)     Insomnia     Acneiform rash     Elevated prostate specific antigen (PSA)     Low back pain     Malignant neoplasm of lower lobe of left lung (H)     Type 2 diabetes mellitus with hyperglycemia, without long-term current use of insulin (H)     Essential hypertension with goal blood pressure less than 140/90     Health Care Home       Current Outpatient Prescriptions   Medication Sig Dispense Refill     amLODIPine (NORVASC) 10 MG tablet Take 1 tablet (10 mg) by mouth daily 90 tablet 1     ASPIRIN NOT PRESCRIBED (INTENTIONAL) 1 each continuous prn Antiplatelet medication not prescribed intentionally due to oncology therapy. 0 each 0     atorvastatin (LIPITOR) 40 MG tablet Take 1 tablet (40 mg) by mouth daily 90 tablet 3     carvedilol (COREG) 25 MG tablet TAKE ONE TABLET BY MOUTH TWICE DAILY with meals 180 tablet 1     clindamycin (CLINDAMAX) 1 % gel Use on face 2 times a day 30 g 3     clobetasol propionate (CLOBEX) 0.05 % SHAM Externally apply 1 Bottle topically daily Apply to scalp prior to showering.  Leave on for 15 min 118 mL 1     cyclobenzaprine (FLEXERIL) 5 MG tablet Take 1 tablet (5 mg) by mouth daily as needed for muscle spasms 20 tablet 0     diphenoxylate-atropine (LOMOTIL) 2.5-0.025 MG per tablet Take 1 tablet by mouth 4 times daily as needed for diarrhea 40 tablet 3     Emollient (AQUAPHOR ADVANCED THERAPY) OINT Externally apply 1 dose topically 2 times daily 30 g 2     famotidine  (PEPCID) 40 MG tablet TAKE 1 TABLET (40 MG) BY MOUTH AT BEDTIME 90 tablet 3     fluocinonide (LIDEX) 0.05 % cream        glucose blood VI test strips (ASCENSIA MICROFILL TEST) strip 3 times daily. 200 strip 5     HYDROcodone-acetaminophen (NORCO) 5-325 MG per tablet Take 1 tablet by mouth every 4 hours as needed for moderate to severe pain       linagliptin-metFORMIN (JENTADUETO) 2.5-1000 MG per tablet TAKE ONE TABLET BY MOUTH TWICE DAILY with food 180 tablet 1     lisinopril (PRINIVIL/ZESTRIL) 40 MG tablet Take 1 tablet (40 mg) by mouth 2 times daily 180 tablet 1     LORazepam (ATIVAN) 1 MG tablet Take 0.5-1 tablets (0.5-1 mg) by mouth once as needed for anxiety (take prior to MRI) Take 30 minutes prior to departure.  Do not operate a vehicle after taking this medication 1 tablet 0     minocycline (DYNACIN) 100 MG tablet Take 1 tablet (100 mg) by mouth daily 28 tablet 1     mometasone (ELOCON) 0.1 % cream APPLY 1 GRAM TOPICALLY DAILY 45 g 1     MULTIVITAMIN OR 1 TABLET DAILY       nystatin (MYCOSTATIN) cream   0     ORDER FOR DME Equipment being ordered: Glucometer test strips for three times daily testing--fill with appropriate brand for the new glucometer 400 strip 3     ORDER FOR DME Equipment being ordered: Home Glucometer 1 Units 1     osimertinib (TAGRISSO) 80 MG tablet Take 80 mg by mouth daily       pioglitazone (ACTOS) 30 MG tablet TAKE ONE TABLET BY MOUTH ONE TIME DAILY 90 tablet 0     polyethylene glycol 0.4%- propylene glycol 0.3% (SYSTANE ULTRA) 0.4-0.3 % SOLN ophthalmic solution Place 1 drop into both eyes 3 times daily       potassium chloride (K-TAB,KLOR-CON) 10 MEQ tablet TAKE ONE TABLET BY MOUTH ONE TIME DAILY 90 tablet 3     prochlorperazine (COMPAZINE) 10 MG tablet        tamsulosin (FLOMAX) 0.4 MG capsule TAKE ONE CAPSULE BY MOUTH ONE TIME DAILY 90 capsule 0       Allergies   Allergen Reactions     Aspirin        Family History   Problem Relation Age of Onset     Diabetes Mother       Hypertension Mother      Diabetes Father      Hypertension Father      Alcohol/Drug Father      Hypertension Maternal Grandmother      Cerebrovascular Disease Maternal Grandmother      Hypertension Maternal Grandfather      Alcohol/Drug Maternal Grandfather      Hypertension Paternal Grandmother      Hypertension Paternal Grandfather      Asthma No family hx of      C.A.D. No family hx of      Breast Cancer No family hx of      Cancer - colorectal No family hx of      Prostate Cancer No family hx of      Alzheimer Disease No family hx of      Allergies No family hx of      Anesthesia Reaction No family hx of      Arthritis No family hx of      Blood Disease No family hx of      Cancer No family hx of      Hearing Loss No family hx of      Family History Negative No family hx of      Thyroid Disease No family hx of      Respiratory No family hx of      Osteoporosis No family hx of      Obesity No family hx of      Neurologic Disorder No family hx of      Psychotic Disorder No family hx of      Musculoskeletal Disorder No family hx of      Lipids No family hx of      Heart Disease No family hx of      Gynecology No family hx of      Genitourinary Problems No family hx of      Genetic Disorder No family hx of      GASTROINTESTINAL DISEASE No family hx of      Eye Disorder No family hx of      Endocrine Disease No family hx of      Depression No family hx of      Connective Tissue Disorder No family hx of      Congenital Anomalies No family hx of      Circulatory No family hx of      Cardiovascular No family hx of      KIDNEY DISEASE No family hx of      Anxiety Disorder No family hx of      Mental Illness No family hx of      Substance Abuse No family hx of      Other Cancer No family hx of      Colon Cancer No family hx of      Hyperlipidemia No family hx of      Glaucoma No family hx of      Macular Degeneration No family hx of        Additional medical/Social/Surgical histories reviewed in EPIC and updated as  appropriate.     REVIEW OF SYSTEMS (11/29/2018)  10 point ROS of systems including Constitutional, Eyes, Respiratory, Cardiovascular, Gastroenterology, Genitourinary, Integumentary, Musculoskeletal, Psychiatric were all negative except for pertinent positives noted in my HPI.     PHYSICAL EXAM  Vitals:    11/29/18 1121   BP: 113/59   Pulse: 85   Weight: 89.8 kg (198 lb)     Vital Signs: /59  Pulse 85  Wt 89.8 kg (198 lb)  BMI 30.55 kg/m2 Patient declined being weighed. Body mass index is 30.55 kg/(m^2).    General  - normal appearance, in no obvious distress  CV  - normal radial pulse  Pulm  - normal respiratory pattern, non-labored  Musculoskeletal - neck  - inspection: normal bone and joint alignment, no obvious deformity, no scapular winging, no AC step-off  - palpation: mildly tender cervical muscles, normal clavicle, non-tender AC  - ROM:  painful and limited flexion and extenison of neck  - strength: 5/5  strength, 5/5 in all shoulder planes  - special tests:  Positive spulrings   - no sensory or motor deficit, grossly normal coordination, normal muscle tone  Skin  - no ecchymosis, erythema, warmth, or induration, no obvious rash  Psych  - interactive, appropriate, normal mood and affect  ASSESSMENT & PLAN  64 yo male with cervical ddd  Discussed MRI  Ordered cervical injection  Cont. HEP  See orders for meds  F/u in 2-3 weeks    Horacio Mayen MD, CAQSM

## 2018-11-29 NOTE — LETTER
11/29/2018         RE: Tolu Villeda  [address] [facility] [address]        Dear Colleague,    Thank you for referring your patient, Tolu Villeda, to the [facility]. Please see a copy of my visit note below.    HISTORY OF PRESENT ILLNESS  Mr. Villeda is a pleasant 63 year old year old male who presents to clinic today with   followup for cervical MRI  Still feeling pain   Would like some medications and discuss injection  Patient Active Problem List   Diagnosis     Hyperlipidemia with target LDL less than 100     Smoking-quit in 8/12     GERD (gastroesophageal reflux disease)     ED (erectile dysfunction)     BMI 34.0-34.9,adult     Advanced directives, counseling/discussion     Microalbuminuria     Adenocarcinoma of lung (H)     Insomnia     Acneiform rash     Elevated prostate specific antigen (PSA)     Low back pain     Malignant neoplasm of lower lobe of left lung (H)     Type 2 diabetes mellitus with hyperglycemia, without long-term current use of insulin (H)     Essential hypertension with goal blood pressure less than 140/90     Health Care Home       Current Outpatient Prescriptions   Medication Sig Dispense Refill     amLODIPine (NORVASC) 10 MG tablet Take 1 tablet (10 mg) by mouth daily 90 tablet 1     ASPIRIN NOT PRESCRIBED (INTENTIONAL) 1 each continuous prn Antiplatelet medication not prescribed intentionally due to oncology therapy. 0 each 0     atorvastatin (LIPITOR) 40 MG tablet Take 1 tablet (40 mg) by mouth daily 90 tablet 3     carvedilol (COREG) 25 MG tablet TAKE ONE TABLET BY MOUTH TWICE DAILY with meals 180 tablet 1     clindamycin (CLINDAMAX) 1 % gel Use on face 2 times a day 30 g 3     clobetasol propionate (CLOBEX) 0.05 % SHAM Externally apply 1 Bottle topically daily Apply to scalp prior to showering.  Leave on for 15 min 118 mL 1     cyclobenzaprine (FLEXERIL) 5 MG tablet Take 1 tablet (5 mg) by mouth daily as needed for muscle spasms 20 tablet 0      diphenoxylate-atropine (LOMOTIL) 2.5-0.025 MG per tablet Take 1 tablet by mouth 4 times daily as needed for diarrhea 40 tablet 3     Emollient (AQUAPHOR ADVANCED THERAPY) OINT Externally apply 1 dose topically 2 times daily 30 g 2     famotidine (PEPCID) 40 MG tablet TAKE 1 TABLET (40 MG) BY MOUTH AT BEDTIME 90 tablet 3     fluocinonide (LIDEX) 0.05 % cream        glucose blood VI test strips (ASCENSIA MICROFILL TEST) strip 3 times daily. 200 strip 5     HYDROcodone-acetaminophen (NORCO) 5-325 MG per tablet Take 1 tablet by mouth every 4 hours as needed for moderate to severe pain       linagliptin-metFORMIN (JENTADUETO) 2.5-1000 MG per tablet TAKE ONE TABLET BY MOUTH TWICE DAILY with food 180 tablet 1     lisinopril (PRINIVIL/ZESTRIL) 40 MG tablet Take 1 tablet (40 mg) by mouth 2 times daily 180 tablet 1     LORazepam (ATIVAN) 1 MG tablet Take 0.5-1 tablets (0.5-1 mg) by mouth once as needed for anxiety (take prior to MRI) Take 30 minutes prior to departure.  Do not operate a vehicle after taking this medication 1 tablet 0     minocycline (DYNACIN) 100 MG tablet Take 1 tablet (100 mg) by mouth daily 28 tablet 1     mometasone (ELOCON) 0.1 % cream APPLY 1 GRAM TOPICALLY DAILY 45 g 1     MULTIVITAMIN OR 1 TABLET DAILY       nystatin (MYCOSTATIN) cream   0     ORDER FOR DME Equipment being ordered: Glucometer test strips for three times daily testing--fill with appropriate brand for the new glucometer 400 strip 3     ORDER FOR DME Equipment being ordered: Home Glucometer 1 Units 1     osimertinib (TAGRISSO) 80 MG tablet Take 80 mg by mouth daily       pioglitazone (ACTOS) 30 MG tablet TAKE ONE TABLET BY MOUTH ONE TIME DAILY 90 tablet 0     polyethylene glycol 0.4%- propylene glycol 0.3% (SYSTANE ULTRA) 0.4-0.3 % SOLN ophthalmic solution Place 1 drop into both eyes 3 times daily       potassium chloride (K-TAB,KLOR-CON) 10 MEQ tablet TAKE ONE TABLET BY MOUTH ONE TIME DAILY 90 tablet 3     prochlorperazine  (COMPAZINE) 10 MG tablet        tamsulosin (FLOMAX) 0.4 MG capsule TAKE ONE CAPSULE BY MOUTH ONE TIME DAILY 90 capsule 0       Allergies   Allergen Reactions     Aspirin        Family History   Problem Relation Age of Onset     Diabetes Mother      Hypertension Mother      Diabetes Father      Hypertension Father      Alcohol/Drug Father      Hypertension Maternal Grandmother      Cerebrovascular Disease Maternal Grandmother      Hypertension Maternal Grandfather      Alcohol/Drug Maternal Grandfather      Hypertension Paternal Grandmother      Hypertension Paternal Grandfather      Asthma No family hx of      C.A.D. No family hx of      Breast Cancer No family hx of      Cancer - colorectal No family hx of      Prostate Cancer No family hx of      Alzheimer Disease No family hx of      Allergies No family hx of      Anesthesia Reaction No family hx of      Arthritis No family hx of      Blood Disease No family hx of      Cancer No family hx of      Hearing Loss No family hx of      Family History Negative No family hx of      Thyroid Disease No family hx of      Respiratory No family hx of      Osteoporosis No family hx of      Obesity No family hx of      Neurologic Disorder No family hx of      Psychotic Disorder No family hx of      Musculoskeletal Disorder No family hx of      Lipids No family hx of      Heart Disease No family hx of      Gynecology No family hx of      Genitourinary Problems No family hx of      Genetic Disorder No family hx of      GASTROINTESTINAL DISEASE No family hx of      Eye Disorder No family hx of      Endocrine Disease No family hx of      Depression No family hx of      Connective Tissue Disorder No family hx of      Congenital Anomalies No family hx of      Circulatory No family hx of      Cardiovascular No family hx of      KIDNEY DISEASE No family hx of      Anxiety Disorder No family hx of      Mental Illness No family hx of      Substance Abuse No family hx of      Other Cancer  No family hx of      Colon Cancer No family hx of      Hyperlipidemia No family hx of      Glaucoma No family hx of      Macular Degeneration No family hx of        Additional medical/Social/Surgical histories reviewed in [facility] and updated as appropriate.     REVIEW OF SYSTEMS (11/29/2018)  10 point ROS of systems including Constitutional, Eyes, Respiratory, Cardiovascular, Gastroenterology, Genitourinary, Integumentary, Musculoskeletal, Psychiatric were all negative except for pertinent positives noted in my HPI.     PHYSICAL EXAM  Vitals:    11/29/18 1121   BP: 113/59   Pulse: 85   Weight: 89.8 kg (198 lb)     Vital Signs: /59  Pulse 85  Wt 89.8 kg (198 lb)  BMI 30.55 kg/m2 Patient declined being weighed. Body mass index is 30.55 kg/(m^2).    General  - normal appearance, in no obvious distress  CV  - normal radial pulse  Pulm  - normal respiratory pattern, non-labored  Musculoskeletal - neck  - inspection: normal bone and joint alignment, no obvious deformity, no scapular winging, no AC step-off  - palpation: mildly tender cervical muscles, normal clavicle, non-tender AC  - ROM:  painful and limited flexion and extenison of neck  - strength: 5/5  strength, 5/5 in all shoulder planes  - special tests:  Positive spulrings   - no sensory or motor deficit, grossly normal coordination, normal muscle tone  Skin  - no ecchymosis, erythema, warmth, or induration, no obvious rash  Psych  - interactive, appropriate, normal mood and affect  ASSESSMENT & PLAN  62 yo male with cervical ddd  Discussed MRI  Ordered cervical injection  Cont. HEP  See orders for meds  F/u in 2-3 weeks    Horacio Mayen MD, CAQSM    Again, thank you for allowing me to participate in the care of your patient.        Sincerely,        Horacio Mayen MD

## 2018-11-29 NOTE — PATIENT INSTRUCTIONS
Thanks for coming today.  [facility]  [address] [place] [address]    To schedule future appointments in Ortho Clinic, you may call [phone].    To schedule ordered imaging by your provider:   Call [facility] [phone]    To schedule an injection ordered by your provider:  Call [facility] Injection scheduling line: [phone]  G[facility] Num[facility]riquehart available online at:  [email or web address]    Please call if any further questions or concerns ([phone]).  Clinic hours 8 am to 5 pm.    Return to clinic (call) if symptoms worsen or fail to improve.

## 2018-11-29 NOTE — NURSING NOTE
Tolu Villeda's chief complaint for this visit includes:  Chief Complaint   Patient presents with     RECHECK     Follow up after MRI     PCP: Javed Maier    Referring Provider:  No referring provider defined for this encounter.    /59  Pulse 85  Wt 89.8 kg (198 lb)  BMI 30.55 kg/m2  Moderate Pain (5)

## 2018-12-06 NOTE — TELEPHONE ENCOUNTER
[facility] phone call message- patient requests medication or medication refill:    If this is a refill request, has the caller requested the refill from the pharmacy already? No  Name of the pharmacy and phone number for the current request:  [facility]    Name of the medication requested: 2 boxes of Contour test strips (50 strips in a box)   Other request: I did tell patient to call pharmacy first next time        OK to leave the result message on voice mail or with a family member? NO    Call taken on 12/6/2018 at 1:19 PM by Lisset Connor

## 2018-12-06 NOTE — TELEPHONE ENCOUNTER
Prescription approved per [facility] Refill Protocol.    Frannie Hutchins RN, BSN

## 2018-12-07 NOTE — TELEPHONE ENCOUNTER
[facility] Call Center    Phone Message    May a detailed message be left on voicemail: yes    Reason for Call: Patient was told to follow up with Dr. Mayen about the pain medication he was taking and how it was working. Patient states it is not working for the pain and is raising his BP. His blood Glucose levels are over 300 since starting this medication.     Action Taken: Message routed to:  Adult Clinics: Sports Medicine p [id number]

## 2018-12-10 NOTE — TELEPHONE ENCOUNTER
[employer]:  Sent 10/1/18 with 3 month supply. Refill not appropriate at this time.     Tiara Bill, RN, BSN

## 2018-12-10 NOTE — TELEPHONE ENCOUNTER
"Requested Prescriptions   Pending Prescriptions Disp Refills     carvedilol (COREG) 25 MG tablet [Pharmacy Med Name: Carvedilol Oral Tablet 25 MG] 180 tablet 0     Sig: TAKE ONE TABLET BY MOUTH TWICE DAILY WITH MEALS    Beta-Blockers Protocol Passed - 12/10/2018 11:33 AM       Passed - Blood pressure under 140/90 in past 12 months    BP Readings from Last 3 Encounters:   11/29/18 113/59   11/15/18 132/72   10/26/18 106/48          Passed - Patient is age 6 or older       Passed - Recent (12 mo) or future (30 days) visit within the authorizing provider's specialty    Patient had office visit in the last 12 months or has a visit in the next 30 days with authorizing provider or within the authorizing provider's specialty.  See \"Patient Info\" tab in inbasket, or \"Choose Columns\" in Meds & Orders section of the refill encounter.          carvedilol (COREG) 25 MG tablet  Medication is being filled for 1 time refill only due to:  Patient needs to be seen because due for 3 month follow up with [facility].     Please assist with scheduling.    Frannie Hutchins, RN, BSN         "

## 2018-12-10 NOTE — TELEPHONE ENCOUNTER
"Spoke to patient. He states he has \"suffered all weekend\" due to pain and discomfort. He is upset that no one called him back on Friday when he originally called.     He is requesting suggestions or a new prescription for pain from Dr. Mayen today or a call back from Dr. Mayen to discuss.     Let him know that I will discuss with Dr. Mayen when he gets into clinic around noon today.       "

## 2018-12-10 NOTE — TELEPHONE ENCOUNTER
Reason for Call:  Other prescription    Detailed comments: Pharmacy will not let patient  test strips until provider fills out Form B for medicare     Phone Number Patient can be reached at: Home number on file [phone] (home)    Best Time: Anytime     Can we leave a detailed message on this number? YES    Call taken on 12/10/2018 at 11:22 AM by April Marquez

## 2018-12-10 NOTE — TELEPHONE ENCOUNTER
"Requested Prescriptions   Pending Prescriptions Disp Refills     pioglitazone (ACTOS) 30 MG tablet 90 tablet 0     Sig: Take 1 tablet (30 mg) by mouth daily    Thiazolidinedione Agents (TZDs)  Passed - 12/10/2018  9:56 AM       Passed - Blood pressure less than 140/90 in past 6 months    BP Readings from Last 3 Encounters:   11/29/18 113/59   11/15/18 132/72   10/26/18 106/48                Passed - Patient has documented LDL within the past 12 mos.    Recent Labs   Lab Test 03/12/18  1223   LDL 43            Passed - Patient has a normal ALT within the past 12 mos.    Recent Labs   Lab Test 08/07/18  1209   ALT 25            Passed - Patient has a normal AST within the past 12 mos.     Recent Labs   Lab Test 08/07/18  1209   AST 13            Passed - Patient has had a Microalbumin in the past 12 mos.    Recent Labs   Lab Test 08/07/18  1214   MICROL 138   UMALCR 46.62*            Passed - Patient has documented A1c within the specified period of time.    If HgbA1C is 8 or greater, it needs to be on file within the past 3 months.  If less than 8, must be on file within the past 6 months.     Recent Labs   Lab Test 08/07/18  1209   A1C 5.7*            Passed - Diagnosis not CHF       Passed - Patient is age 18 or older       Passed - Patient has a normal serum Creatinine in the past 12 months    Recent Labs   Lab Test 08/07/18  1209   CR 1.18            Passed - Recent (6 mo) or future (30 days) visit within the authorizing provider's specialty    Patient had office visit in the last 6 months or has a visit in the next 30 days with authorizing provider or within the authorizing provider's specialty.  See \"Patient Info\" tab in inbasket, or \"Choose Columns\" in Meds & Orders section of the refill encounter.            Last Written Prescription Date:  10/1/18  Last Fill Quantity: 90,  # refills: 0   Last office visit: 10/26/2018 with prescribing provider:  SHAUN   Future Office Visit:      "

## 2018-12-11 NOTE — TELEPHONE ENCOUNTER
Patient called and is upset and is stating he can't take his diabetes medication because he is unable to take his Blood Sugar. He also states that he was shorted on the 2 prescriptions that were just filled for him. He would like someone to call him back ASAP

## 2018-12-18 NOTE — PROGRESS NOTES
: Armando was seen in X-ray today for a cervical epidural injection. Patient rated pain before procedure 6/10. After procedure patient rated pain 4/10. This pain level is (is/is not) acceptable to patient. Patient discharged home with Daughter in law.       AFTER YOU GO HOME    ? DO relax; minimize your activity for 24 hours  ? You may resume normal activity tomorrow  ? You may remove the bandage in the evening or next morning  ? You may resume bathing the next day  ? Drink at least 4 extra glasses of fluid today if not on fluid restrictions  ? DO NOT drive or operate machinery at home or at work for at least 24 hours      VISIT THE EMERGENCY ROOM OR URGENT CARE IF:    ? There is redness or swelling at the injection site  ? There is discharge from the injection site  ? You develop a temperature of 101  F or greater      ADDITIONAL INSTRUCTIONS:     ? You may resume your Coumadin or other blood thinner at your regular dose today.  Follow up with your physician to have your INR rechecked if indicated.  ? If you gain no relief from the injection after two (2) weeks, follow-up with your provider for your options.        Contacts:    During business hours from 8 to 5 pm, you may call [phone] to reach a nurse advisor at [facility].  After hours, call [facility]  [phone].  Ask for the Radiologist on-call.  Someone is on-call 24 hrs/day.  [facility] Toll Free Number   [phone]

## 2018-12-24 NOTE — TELEPHONE ENCOUNTER
[facility] Call Center    Phone Message    May a detailed message be left on voicemail: no    Reason for Call: Other: Pt had neck injection last week.  Is still in severe pain.  Is asking for a call back this morning.  Pt is asking for Tramadol.  Please call. [phone]     Action Taken: Message routed to:  Adult Clinics: Sports Medicine p [id number]

## 2018-12-26 NOTE — TELEPHONE ENCOUNTER
Returned call to patient to discuss.   Left VM to return call to clinic.     Will need to discuss with Dr. Dixon when he returns to clinic about possible Tramadol RX as Dr. Mayen is out of the office for 2 weeks.

## 2018-12-26 NOTE — PROGRESS NOTES
SUBJECTIVE:   Tolu Villeda is a 63 year old male who presents to clinic today for the following health issues:    Hypertension     Diabetes:     Frequency of checking blood sugars::  2 times a day    Diabetic concerns::  None    Hypoglycemia symptoms::  None    Paraesthesia present::  No    Eye Exam in the last year::  Yes (about 1 year ago )    [facility]    Hyperlipidemia:     Low fat/chol diet rating::  Good    Taking Statins::  YES    Side effects from hypolipidemia medication::  Muscle aches after taking Statin    Lipid Medications or Supplements::  None    Hypertension:     Outpatient blood pressures:  Are being checked    Blood pressures checked at:  Home    Dietary sodium intake::  No added salt diet  Hyperlipidemia     Diabetes:     Frequency of checking blood sugars::  2 times a day    Diabetic concerns::  None    Hypoglycemia symptoms::  None    Paraesthesia present::  No    Eye Exam in the last year::  Yes (about 1 year ago )    Di[facility] Management [facility]    Hyperlipidemia:     Low fat/chol diet rating::  Good    Taking Statins::  YES    Side effects from hypolipidemia medication::  Muscle aches after taking Statin    Lipid Medications or Supplements::  None    Hypertension:     Outpatient blood pressures:  Are being checked    Blood pressures checked at:  Home    Dietary sodium intake::  No added salt diet  Diabetes     Diabetes:     Frequency of checking blood sugars::  2 times a day    Diabetic concerns::  None    Hypoglycemia symptoms::  None    Paraesthesia present::  No    Eye Exam in the last year::  Yes (about 1 year ago )    Diabetes Management Resources    Hyperlipidemia:     Low fat/chol diet rating::  Good    Taking Statins::  YES    Side effects from hypolipidemia medication::  Muscle aches after taking Statin    Lipid Medications or Supplements::  None    Hypertension:     Outpatient blood pressures:  Are being checked    Blood pressures checked at:  Home     Dietary sodium intake::  No added salt diet    Holidays went well for Armando. He saw Dr. Mayen, whom he likes as a person, but is frustrated that his clinic has not been contacting him back even after 3-4 phone calls. Currently he is out until later in January. He can't take Ibuprofen, because of drug contradictions. He tried getting Epidural which has not worked on the first round, so they plan on performing it again. He was prescribed Gabapentin which has not been helping. He has been taking 3- Tylenols 650 mg each, along with this he is taking one Hydrocodone as well as a muscle relaxer for about 3 weeks.     He had a problem with his sciatic nerve in the past, which he had an Epidural shot for as well- took two tries for relief, so he is hopeful for his neck. He has tried using heat packs, which have helped short term. Has not tried icing. 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     He reports that his sleep has been poor.  He rarely drinks alcohol.     Problem list and histories reviewed & adjusted, as indicated.  Additional history: as documented    Patient Active Problem List   Diagnosis     Hyperlipidemia with target LDL less than 100     Smoking-quit in 8/12     GERD (gastroesophageal reflux disease)     ED (erectile dysfunction)     BMI 34.0-34.9,adult     Advanced directives, counseling/discussion     Microalbuminuria     Adenocarcinoma of lung (H)     Insomnia     Acneiform rash     Elevated prostate specific antigen (PSA)     Low back pain     Malignant neoplasm of lower lobe of left lung (H)     Type 2 diabetes mellitus with hyperglycemia, without long-term current use of insulin (H)     Essential hypertension with goal blood pressure less than 140/90     Health Care Home     Past Surgical History:   Procedure Laterality Date     COLONOSCOPY  12/04/2014    [facility]     SURGICAL HISTORY OF -   4/2009     Left testicular surgery with benign growth/probable hydrocele       Social History     Tobacco Use     Smoking status: Former Smoker     Packs/day: 1.00     Years: 50.00     Pack years: 50.00     Types: Cigarettes     Last attempt to quit: 2013     Years since quittin.4     Smokeless tobacco: Never Used   Substance Use Topics     Alcohol use: No     Frequency: Never     Comment: occasionally     Family History   Problem Relation Age of Onset     Diabetes Mother      Hypertension Mother      Diabetes Father      Hypertension Father      Alcohol/Drug Father      Hypertension Maternal Grandmother      Cerebrovascular Disease Maternal Grandmother      Hypertension Maternal Grandfather      Alcohol/Drug Maternal Grandfather      Hypertension Paternal Grandmother      Hypertension Paternal Grandfather      Asthma No family hx of      C.A.D. No family hx of      Breast Cancer No family hx of      Cancer - colorectal No family hx of      Prostate Cancer No family hx of      Alzheimer Disease No family hx of      Allergies No family hx of      Anesthesia Reaction No family hx of      Arthritis No family hx of      Blood Disease No family hx of      Cancer No family hx of      Hearing Loss No family hx of      Family History Negative No family hx of      Thyroid Disease No family hx of      Respiratory No family hx of      Osteoporosis No family hx of      Obesity No family hx of      Neurologic Disorder No family hx of      Psychotic Disorder No family hx of      Musculoskeletal Disorder No family hx of      Lipids No family hx of      Heart Disease No family hx of      Gynecology No family hx of      Genitourinary Problems No family hx of      Genetic Disorder No family hx of      Gastrointestinal Disease No family hx of      Eye Disorder No family hx of      Endocrine Disease No family hx of      Depression No family hx of      Connective Tissue Disorder No family hx of      Congenital Anomalies No family hx of       Circulatory No family hx of      Cardiovascular No family hx of      Kidney Disease No family hx of      Anxiety Disorder No family hx of      Mental Illness No family hx of      Substance Abuse No family hx of      Other Cancer No family hx of      Colon Cancer No family hx of      Hyperlipidemia No family hx of      Glaucoma No family hx of      Macular Degeneration No family hx of          Current Outpatient Medications   Medication Sig Dispense Refill     amLODIPine (NORVASC) 10 MG tablet Take 1 tablet (10 mg) by mouth daily 90 tablet 1     atorvastatin (LIPITOR) 40 MG tablet Take 1 tablet (40 mg) by mouth daily 90 tablet 3     blood glucose monitoring (DEISY CONTOUR) test strip Use 3 times per day 200 strip 5     carvedilol (COREG) 25 MG tablet TAKE ONE TABLET BY MOUTH TWICE DAILY WITH MEALS 180 tablet 1     clindamycin (CLINDAMAX) 1 % gel Use on face 2 times a day 30 g 3     clobetasol propionate (CLOBEX) 0.05 % SHAM Externally apply 1 Bottle topically daily Apply to scalp prior to showering.  Leave on for 15 min 118 mL 1     famotidine (PEPCID) 40 MG tablet TAKE 1 TABLET (40 MG) BY MOUTH AT BEDTIME 90 tablet 3     HYDROcodone-acetaminophen (NORCO) 5-325 MG per tablet Take 1 tablet by mouth every 4 hours as needed for moderate to severe pain       linagliptin-metFORMIN (JENTADUETO) 2.5-1000 MG per tablet TAKE ONE TABLET BY MOUTH TWICE DAILY with food 180 tablet 1     lisinopril (PRINIVIL/ZESTRIL) 40 MG tablet Take 1 tablet (40 mg) by mouth 2 times daily 180 tablet 1     minocycline (DYNACIN) 100 MG tablet Take 100 mg by mouth daily as needed  28 tablet 1     MULTIVITAMIN OR 1 TABLET DAILY       ORDER FOR DME Equipment being ordered: Glucometer test strips for three times daily testing--fill with appropriate brand for the new glucometer 400 strip 3     ORDER FOR DME Equipment being ordered: Home Glucometer 1 Units 1     osimertinib (TAGRISSO) 80 MG tablet Take 80 mg by mouth daily       pioglitazone (ACTOS)  30 MG tablet Take 1 tablet (30 mg) by mouth daily 90 tablet 2     potassium chloride (K-TAB,KLOR-CON) 10 MEQ tablet TAKE ONE TABLET BY MOUTH ONE TIME DAILY 90 tablet 3     tamsulosin (FLOMAX) 0.4 MG capsule TAKE ONE CAPSULE BY MOUTH ONE TIME DAILY 90 capsule 2     traMADol (ULTRAM) 50 MG tablet Take 1 tablet (50 mg) by mouth every 6 hours as needed for severe pain 20 tablet 0     ASPIRIN NOT PRESCRIBED (INTENTIONAL) 1 each continuous prn Antiplatelet medication not prescribed intentionally due to oncology therapy. (Patient not taking: Reported on 1/3/2019) 0 each 0     diphenoxylate-atropine (LOMOTIL) 2.5-0.025 MG per tablet Take 1 tablet by mouth 4 times daily as needed for diarrhea (Patient not taking: Reported on 1/3/2019) 40 tablet 3     fluocinonide (LIDEX) 0.05 % cream        gabapentin (NEURONTIN) 100 MG capsule Take 1 capsule (100 mg) by mouth 4 times daily 90 capsule 1     mometasone (ELOCON) 0.1 % cream APPLY 1 GRAM TOPICALLY DAILY (Patient not taking: Reported on 1/3/2019) 45 g 1     polyethylene glycol 0.4%- propylene glycol 0.3% (SYSTANE ULTRA) 0.4-0.3 % SOLN ophthalmic solution Place 1 drop into both eyes 3 times daily (Patient not taking: Reported on 1/3/2019)       prochlorperazine (COMPAZINE) 10 MG tablet        tiZANidine (ZANAFLEX) 4 MG tablet Take 1-2 tablets (4-8 mg) by mouth nightly as needed (Patient not taking: Reported on 1/3/2019) 30 tablet 1     Allergies   Allergen Reactions     Aspirin      Recent Labs   Lab Test 01/03/19  1253 08/07/18  1209 03/12/18  1223  07/03/17  1209 11/21/16  1044  01/19/16  1210   A1C 6.4* 5.7* 5.6   < > 5.7 5.7   < > 5.9   LDL  --   --  43  --  43 51   < > 32   HDL  --   --  64  --  55 49   < > 51   TRIG  --   --  72  --  100 107   < > 103   ALT 37 25 22  --  23 22  --  16   CR  --  1.18 1.16   < > 1.09 1.02  --  1.14   GFRESTIMATED  --  62 63   < > 68 74  --  65   GFRESTBLACK  --  75 76   < > 82 89  --  79   POTASSIUM  --  5.1 4.5   < > 4.5 4.7  --  4.5   TSH   "--   --  3.08  --   --   --   --  3.51    < > = values in this interval not displayed.      BP Readings from Last 3 Encounters:   01/03/19 90/45   12/18/18 132/63   11/29/18 113/59    Wt Readings from Last 3 Encounters:   01/03/19 88 kg (194 lb)   11/29/18 89.8 kg (198 lb)   10/26/18 89.8 kg (198 lb)        Labs reviewed in EPIC    ROS:  Constitutional, neuro, ENT, endocrine, pulmonary, cardiac, gastrointestinal, genitourinary, musculoskeletal, integument and psychiatric systems are negative, except as otherwise noted.    This document serves as a record of the services and decisions personally performed and made by Alyx Hahn CNP. It was created on his/her behalf by Brittney Olivares, trained medical scribe. The creation of this document is based the provider's statements to the medical scribes.    Scribe Brittney Olivares 12:19 PM, January 3, 2019    OBJECTIVE:                                                    BP 90/45   Pulse 86   Temp 97.7  F (36.5  C) (Oral)   Resp 16   Ht 1.71 m (5' 7.32\")   Wt 88 kg (194 lb)   BMI 30.09 kg/m    Body mass index is 30.09 kg/m .  GENERAL APPEARANCE: healthy, alert and no distress  HENT: ear canals and TM's normal and nose and mouth without ulcers or lesions  NECK: no adenopathy, no asymmetry, masses, or scars and thyroid normal to palpation  RESP: lungs clear to auscultation - no rales, rhonchi or wheezes  CV: regular rates and rhythm, normal S1 S2, no S3 or S4 and no murmur, click or rub  MS: Full Range of Motion, non tender, tightness around trapezus muscle, normal right arm range of motion.  NEURO: Normal strength and tone, mentation intact and speech normal  PSYCH: mentation appears normal and affect normal/bright    Diagnostic Test Results:  Results for orders placed or performed in visit on 01/03/19 (from the past 24 hour(s))   Hepatic panel   Result Value Ref Range    Bilirubin Direct 0.1 0.0 - 0.2 mg/dL    Bilirubin Total 0.4 0.2 - 1.3 mg/dL    Albumin " 3.1 (L) 3.4 - 5.0 g/dL    Protein Total 6.4 (L) 6.8 - 8.8 g/dL    Alkaline Phosphatase 68 40 - 150 U/L    ALT 37 0 - 70 U/L    AST 16 0 - 45 U/L   Hemoglobin A1c   Result Value Ref Range    Hemoglobin A1C 6.4 (H) 0 - 5.6 %        ASSESSMENT/PLAN:                                                        ICD-10-CM    1. DDD (degenerative disc disease), cervical M50.30 traMADol (ULTRAM) 50 MG tablet   2. High risk medication use Z79.899 Hepatic panel   3. Type 2 diabetes mellitus with hyperglycemia, without long-term current use of insulin (H) E11.65 Hemoglobin A1c     pioglitazone (ACTOS) 30 MG tablet   4. Essential hypertension with goal blood pressure less than 140/90 I10 carvedilol (COREG) 25 MG tablet   5. Adenocarcinoma of left lung (H) C34.92    Evaluated degenerative disc disease, Tramadol 50mg refilled- #20. Discussed the Tylenol overdosing and needing to cut back to recommended dosed. Checking LFT's today. Pt. Will be following up with Dr. Mayen later this month. Advise to hold Tramadol and Hydrocodone for severe pain.   He is continuing to work.   Labs ordered, will notify with results.  Discussed type 2 diabetes mellitus with hyperglycemia progress, were refilled Actos 30mg.  Reviewed Hypertension care, well controlled at this time. Medication refills given. COreg refilled.     Follow up with Provider - Dr. Mayen as planned. He is keeping up Q 3 months with Oncology.   6 months with PCP.     The information in this document, created by the medical scribe for me, accurately reflects the services I personally performed and the decisions made by me. I have reviewed and approved this document for accuracy prior to leaving the patient care area.  Alyx Hahn CNP  7:30 PM, January 3, 2019    GABRIELLA Arzola [facility]

## 2018-12-27 NOTE — TELEPHONE ENCOUNTER
Dr. Mayen prescribed Tramadol 50 MG tabs Quantity 10. Attempted to call in prescription, but phone number listed for [facility] in [place] was busy.    Faxed RX to [facility] pharmacy.

## 2019-01-01 ENCOUNTER — PATIENT OUTREACH (OUTPATIENT)
Dept: GERIATRIC MEDICINE | Facility: CLINIC | Age: 64
End: 2019-01-01

## 2019-01-01 ENCOUNTER — TRANSFERRED RECORDS (OUTPATIENT)
Dept: HEALTH INFORMATION MANAGEMENT | Facility: CLINIC | Age: 64
End: 2019-01-01

## 2019-01-01 ENCOUNTER — TELEPHONE (OUTPATIENT)
Dept: FAMILY MEDICINE | Facility: CLINIC | Age: 64
End: 2019-01-01

## 2019-01-01 ENCOUNTER — ANCILLARY PROCEDURE (OUTPATIENT)
Dept: GENERAL RADIOLOGY | Facility: CLINIC | Age: 64
End: 2019-01-01
Payer: MEDICARE

## 2019-01-01 ENCOUNTER — NURSE TRIAGE (OUTPATIENT)
Dept: NURSING | Facility: CLINIC | Age: 64
End: 2019-01-01

## 2019-01-01 ENCOUNTER — OFFICE VISIT (OUTPATIENT)
Dept: URGENT CARE | Facility: URGENT CARE | Age: 64
End: 2019-01-01
Payer: MEDICARE

## 2019-01-01 ENCOUNTER — OFFICE VISIT (OUTPATIENT)
Dept: FAMILY MEDICINE | Facility: CLINIC | Age: 64
End: 2019-01-01
Payer: MEDICARE

## 2019-01-01 VITALS — OXYGEN SATURATION: 99 % | DIASTOLIC BLOOD PRESSURE: 62 MMHG | HEART RATE: 108 BPM | SYSTOLIC BLOOD PRESSURE: 147 MMHG

## 2019-01-01 VITALS
SYSTOLIC BLOOD PRESSURE: 148 MMHG | DIASTOLIC BLOOD PRESSURE: 73 MMHG | WEIGHT: 185 LBS | BODY MASS INDEX: 28.7 KG/M2 | HEART RATE: 103 BPM

## 2019-01-01 VITALS
SYSTOLIC BLOOD PRESSURE: 90 MMHG | RESPIRATION RATE: 16 BRPM | HEIGHT: 67 IN | TEMPERATURE: 97.7 F | HEART RATE: 86 BPM | WEIGHT: 194 LBS | BODY MASS INDEX: 30.45 KG/M2 | DIASTOLIC BLOOD PRESSURE: 45 MMHG

## 2019-01-01 VITALS
WEIGHT: 182 LBS | OXYGEN SATURATION: 99 % | TEMPERATURE: 97.8 F | SYSTOLIC BLOOD PRESSURE: 138 MMHG | BODY MASS INDEX: 28.23 KG/M2 | DIASTOLIC BLOOD PRESSURE: 75 MMHG | HEART RATE: 96 BPM

## 2019-01-01 DIAGNOSIS — G89.29 CHRONIC NECK PAIN: Primary | ICD-10-CM

## 2019-01-01 DIAGNOSIS — G89.29 CHRONIC NECK PAIN: ICD-10-CM

## 2019-01-01 DIAGNOSIS — Z79.899 HIGH RISK MEDICATION USE: ICD-10-CM

## 2019-01-01 DIAGNOSIS — M50.30 DDD (DEGENERATIVE DISC DISEASE), CERVICAL: ICD-10-CM

## 2019-01-01 DIAGNOSIS — E11.65 TYPE 2 DIABETES MELLITUS WITH HYPERGLYCEMIA, WITHOUT LONG-TERM CURRENT USE OF INSULIN (H): ICD-10-CM

## 2019-01-01 DIAGNOSIS — M50.30 DDD (DEGENERATIVE DISC DISEASE), CERVICAL: Primary | ICD-10-CM

## 2019-01-01 DIAGNOSIS — C34.92 ADENOCARCINOMA OF LEFT LUNG (H): ICD-10-CM

## 2019-01-01 DIAGNOSIS — M54.2 CHRONIC NECK PAIN: ICD-10-CM

## 2019-01-01 DIAGNOSIS — I10 ESSENTIAL HYPERTENSION WITH GOAL BLOOD PRESSURE LESS THAN 140/90: ICD-10-CM

## 2019-01-01 DIAGNOSIS — M54.2 CHRONIC NECK PAIN: Primary | ICD-10-CM

## 2019-01-01 LAB
ALBUMIN SERPL-MCNC: 3.1 G/DL (ref 3.4–5)
ALP SERPL-CCNC: 68 U/L (ref 40–150)
ALT SERPL W P-5'-P-CCNC: 37 U/L (ref 0–70)
AST SERPL W P-5'-P-CCNC: 16 U/L (ref 0–45)
BILIRUB DIRECT SERPL-MCNC: 0.1 MG/DL (ref 0–0.2)
BILIRUB SERPL-MCNC: 0.4 MG/DL (ref 0.2–1.3)
HBA1C MFR BLD: 6.4 % (ref 0–5.6)
HIV 1+2 AB+HIV1 P24 AG SERPL QL IA: NONREACTIVE
PROT SERPL-MCNC: 6.4 G/DL (ref 6.8–8.8)

## 2019-01-01 PROCEDURE — 36415 COLL VENOUS BLD VENIPUNCTURE: CPT | Performed by: NURSE PRACTITIONER

## 2019-01-01 PROCEDURE — 99214 OFFICE O/P EST MOD 30 MIN: CPT | Performed by: NURSE PRACTITIONER

## 2019-01-01 PROCEDURE — 83036 HEMOGLOBIN GLYCOSYLATED A1C: CPT | Performed by: NURSE PRACTITIONER

## 2019-01-01 PROCEDURE — 99214 OFFICE O/P EST MOD 30 MIN: CPT | Performed by: PHYSICIAN ASSISTANT

## 2019-01-01 PROCEDURE — 80076 HEPATIC FUNCTION PANEL: CPT | Performed by: NURSE PRACTITIONER

## 2019-01-01 PROCEDURE — 62321 NJX INTERLAMINAR CRV/THRC: CPT | Performed by: RADIOLOGY

## 2019-01-01 PROCEDURE — 99213 OFFICE O/P EST LOW 20 MIN: CPT | Performed by: FAMILY MEDICINE

## 2019-01-01 PROCEDURE — 87389 HIV-1 AG W/HIV-1&-2 AB AG IA: CPT | Performed by: NURSE PRACTITIONER

## 2019-01-01 RX ORDER — OXYCODONE AND ACETAMINOPHEN 5; 325 MG/1; MG/1
1 TABLET ORAL EVERY 6 HOURS PRN
Qty: 18 TABLET | Refills: 0 | Status: SHIPPED | OUTPATIENT
Start: 2019-01-01 | End: 2019-01-01

## 2019-01-01 RX ORDER — OXYCODONE AND ACETAMINOPHEN 5; 325 MG/1; MG/1
1 TABLET ORAL 3 TIMES DAILY PRN
Qty: 8 TABLET | Refills: 0 | Status: SHIPPED | OUTPATIENT
Start: 2019-01-01 | End: 2019-01-01

## 2019-01-01 RX ORDER — IOPAMIDOL 408 MG/ML
10 INJECTION, SOLUTION INTRATHECAL ONCE
Status: COMPLETED | OUTPATIENT
Start: 2019-01-01 | End: 2019-01-01

## 2019-01-01 RX ORDER — PREDNISONE 20 MG/1
20 TABLET ORAL 2 TIMES DAILY
Qty: 14 TABLET | Refills: 0 | Status: SHIPPED | OUTPATIENT
Start: 2019-01-01 | End: 2019-01-01

## 2019-01-01 RX ORDER — OXYCODONE AND ACETAMINOPHEN 5; 325 MG/1; MG/1
1 TABLET ORAL 3 TIMES DAILY PRN
Qty: 12 TABLET | Refills: 0 | Status: SHIPPED | OUTPATIENT
Start: 2019-01-01

## 2019-01-01 RX ORDER — LINAGLIPTIN AND METFORMIN HYDROCHLORIDE 2.5; 1 MG/1; MG/1
TABLET, FILM COATED ORAL
Qty: 180 TABLET | Refills: 0 | Status: SHIPPED | OUTPATIENT
Start: 2019-01-01

## 2019-01-01 RX ORDER — TRAMADOL HYDROCHLORIDE 50 MG/1
50 TABLET ORAL EVERY 6 HOURS PRN
Qty: 20 TABLET | Refills: 0 | Status: SHIPPED | OUTPATIENT
Start: 2019-01-01 | End: 2019-01-01

## 2019-01-01 RX ORDER — PIOGLITAZONEHYDROCHLORIDE 30 MG/1
30 TABLET ORAL DAILY
Qty: 90 TABLET | Refills: 2 | Status: SHIPPED | OUTPATIENT
Start: 2019-01-01

## 2019-01-01 RX ORDER — CARVEDILOL 25 MG/1
TABLET ORAL
Qty: 180 TABLET | Refills: 1 | Status: SHIPPED | OUTPATIENT
Start: 2019-01-01

## 2019-01-01 RX ORDER — BETAMETHASONE SODIUM PHOSPHATE AND BETAMETHASONE ACETATE 3; 3 MG/ML; MG/ML
6 INJECTION, SUSPENSION INTRA-ARTICULAR; INTRALESIONAL; INTRAMUSCULAR; SOFT TISSUE ONCE
Status: COMPLETED | OUTPATIENT
Start: 2019-01-01 | End: 2019-01-01

## 2019-01-01 RX ORDER — TAMSULOSIN HYDROCHLORIDE 0.4 MG/1
CAPSULE ORAL
Qty: 90 CAPSULE | Refills: 2 | Status: SHIPPED | OUTPATIENT
Start: 2019-01-01

## 2019-01-01 RX ORDER — TRAMADOL HYDROCHLORIDE 50 MG/1
50 TABLET ORAL EVERY 6 HOURS PRN
Qty: 20 TABLET | Refills: 0 | Status: SHIPPED | OUTPATIENT
Start: 2019-01-01

## 2019-01-01 RX ORDER — METHOCARBAMOL 500 MG/1
500 TABLET, FILM COATED ORAL 4 TIMES DAILY PRN
Qty: 30 TABLET | Refills: 0 | Status: SHIPPED | OUTPATIENT
Start: 2019-01-01

## 2019-01-01 RX ADMIN — IOPAMIDOL 1 ML: 408 INJECTION, SOLUTION INTRATHECAL at 10:48

## 2019-01-01 RX ADMIN — BETAMETHASONE SODIUM PHOSPHATE AND BETAMETHASONE ACETATE 18 MG: 3; 3 INJECTION, SUSPENSION INTRA-ARTICULAR; INTRALESIONAL; INTRAMUSCULAR; SOFT TISSUE at 10:47

## 2019-01-01 SDOH — HEALTH STABILITY: MENTAL HEALTH: HOW OFTEN DO YOU HAVE A DRINK CONTAINING ALCOHOL?: NEVER

## 2019-01-01 ASSESSMENT — ENCOUNTER SYMPTOMS
EYE PAIN: 0
PALPITATIONS: 0
HEMOPTYSIS: 0
NECK PAIN: 1
COUGH: 0
CONSTITUTIONAL NEGATIVE: 1
WEIGHT LOSS: 0
CARDIOVASCULAR NEGATIVE: 1
FEVER: 0
RESPIRATORY NEGATIVE: 1
DIAPHORESIS: 0

## 2019-01-01 ASSESSMENT — PAIN SCALES - GENERAL
PAINLEVEL: MILD PAIN (3)
PAINLEVEL: SEVERE PAIN (7)
PAINLEVEL: EXTREME PAIN (8)

## 2019-01-01 ASSESSMENT — ACTIVITIES OF DAILY LIVING (ADL): DEPENDENT_IADLS:: SHOPPING

## 2019-01-01 ASSESSMENT — MIFFLIN-ST. JEOR: SCORE: 1628.73

## 2019-01-03 PROBLEM — Z76.89 HEALTH CARE HOME: Status: ACTIVE | Noted: 2018-02-16

## 2019-01-03 NOTE — PATIENT INSTRUCTIONS
Decrease Tylenol intake.   Take 1 Tylenol in 6-8 hours along with new medication, Tramadol.     Follow up with Dr. Mayen.

## 2019-01-07 NOTE — TELEPHONE ENCOUNTER
Tramadol      Last Written Prescription Date:  1/3/2019  Last Fill Quantity: 20,   # refills: 0  Last Office Visit: 1/3/2019  Future Office visit:    Next 5 appointments (look out 90 days)    Jan 24, 2019 12:00 PM CST  Return Visit with Horacio Mayen MD  [facility] ([facility]) [address] [place] [id number]  [phone]           Routing refill request to provider for review/approval because:  Drug not on the [facility], P or [facility] refill protocol or controlled substance

## 2019-01-07 NOTE — TELEPHONE ENCOUNTER
Refilled- Advisign to adhere to Q 6 hour, prn for Tramadol, increasing gabapentin to 100 mg and 200 mg alternating doses QID.       TC- please call [facility]- imaging and see if we can get pt. Scheduled this week for cervical epidural steroid injection with Dr. Delacruz.     Order placed- anytime is great- sooner the better, per pt.     Alyx Hahn

## 2019-01-07 NOTE — TELEPHONE ENCOUNTER
Attempted to inform pt - unable to leave VM - call pt again later.    Pt is scheduled for an injection with Dr. Delacruz at [facility] on 1/24/2019 at 11AM.    He also already had scheduled a follow up appt with Dr Mayen on 1/24.  Since the injection is not being done until 1/24 - please tell pt he will want to reschedule that Dr. Mayen appt for 2 weeks after the injection.  He can call [phone] to do this.

## 2019-01-10 NOTE — TELEPHONE ENCOUNTER
"----- Message from Aaw Leiva RN sent at 1/10/2019 12:22 PM CST -----  I am the [employer]  for this member and have been talking to him on the phone. We had a face to face visit scheduled for tomorrow but, he cancelled on me due to being \"too sick with neck pain\". He says he is really suffering with his \"neck arthritis\" and would like to be referred to another specialist. Such as: \"rheumatology\". Could you speak with this patient and help him to find a specialist that can give him some relief? Thank you. Please let me know if there is anything I can do.   "

## 2019-01-11 NOTE — TELEPHONE ENCOUNTER
Patient would like to talk to you regarding the amount of pain medication .  Please call him regarding his pain and suffering.  He believes it is Inflamation

## 2019-01-14 NOTE — TELEPHONE ENCOUNTER
traMADol (ULTRAM) 50 MG tablet      Last Written Prescription Date:  1/7/2019  Last Fill Quantity: 20,   # refills: 0  Last Office Visit: 1/3/2019  Future Office visit:    Next 5 appointments (look out 90 days)    Jan 24, 2019 12:00 PM CST  Return Visit with Hroacio Mayen MD  [facility] ([facility]) [address] [place] [id number] [phone]           Routing refill request to provider for review/approval because:  Drug not on the [facility], [facility] or [facility] refill protocol or controlled substance

## 2019-01-16 NOTE — PROGRESS NOTES
SUBJECTIVE:     HPI  Tolu Villeda is a 64 year old male who presents to clinic today for the following health issues:  Neck Pain    Duration: 1month.  Has known DDD on MRI of his cervical spine.      Description:  Location: posterior neck  Radiation: down his R arm    Intensity:  severe    Accompanying signs and symptoms: also reports numbness, tingling, and muscle tightness but no weakness.  No swelling, redness, drainage or fevers.  Unable to move his neck due to pain.  No trauma or injuries.        History (similar episodes/previous evaluation): has known DDD of cervical spine, s/p EVERARDO with minimal relief.    Precipitating or alleviating factors: movement and use.  Relieved with rest    Therapies tried and outcome: tramadol, gabapentin, tylenol with no relief       Reviewed PMH, [facility] and SOH.  Patient Active Problem List   Diagnosis     Hyperlipidemia with target LDL less than 100     Smoking-quit in 8/12     GERD (gastroesophageal reflux disease)     ED (erectile dysfunction)     BMI 34.0-34.9,adult     Advanced directives, counseling/discussion     Microalbuminuria     Adenocarcinoma of lung (H)     Insomnia     Acneiform rash     Elevated prostate specific antigen (PSA)     Low back pain     Malignant neoplasm of lower lobe of left lung (H)     Type 2 diabetes mellitus with hyperglycemia, without long-term current use of insulin (H)     Essential hypertension with goal blood pressure less than 140/90     Health Care Home     Current Outpatient Medications   Medication Sig Dispense Refill     amLODIPine (NORVASC) 10 MG tablet Take 1 tablet (10 mg) by mouth daily 90 tablet 1     atorvastatin (LIPITOR) 40 MG tablet Take 1 tablet (40 mg) by mouth daily 90 tablet 3     blood glucose monitoring (DEISY CONTOUR) test strip Use 3 times per day 200 strip 5     carvedilol (COREG) 25 MG tablet TAKE ONE TABLET BY MOUTH TWICE DAILY WITH MEALS 180 tablet 1     clindamycin (CLINDAMAX) 1 % gel Use on face 2 times a day  30 g 3     clobetasol propionate (CLOBEX) 0.05 % SHAM Externally apply 1 Bottle topically daily Apply to scalp prior to showering.  Leave on for 15 min 118 mL 1     diphenoxylate-atropine (LOMOTIL) 2.5-0.025 MG per tablet Take 1 tablet by mouth 4 times daily as needed for diarrhea 40 tablet 3     famotidine (PEPCID) 40 MG tablet TAKE 1 TABLET (40 MG) BY MOUTH AT BEDTIME 90 tablet 3     fluocinonide (LIDEX) 0.05 % cream        gabapentin (NEURONTIN) 100 MG capsule Take 1 capsule (100 mg) by mouth 4 times daily 90 capsule 1     HYDROcodone-acetaminophen (NORCO) 5-325 MG per tablet Take 1 tablet by mouth every 4 hours as needed for moderate to severe pain       linagliptin-metFORMIN (JENTADUETO) 2.5-1000 MG per tablet TAKE ONE TABLET BY MOUTH TWICE DAILY with food 180 tablet 1     lisinopril (PRINIVIL/ZESTRIL) 40 MG tablet Take 1 tablet (40 mg) by mouth 2 times daily 180 tablet 1     minocycline (DYNACIN) 100 MG tablet Take 100 mg by mouth daily as needed  28 tablet 1     mometasone (ELOCON) 0.1 % cream APPLY 1 GRAM TOPICALLY DAILY 45 g 1     MULTIVITAMIN OR 1 TABLET DAILY       ORDER FOR DME Equipment being ordered: Glucometer test strips for three times daily testing--fill with appropriate brand for the new glucometer 400 strip 3     ORDER FOR DME Equipment being ordered: Home Glucometer 1 Units 1     osimertinib (TAGRISSO) 80 MG tablet Take 80 mg by mouth daily       pioglitazone (ACTOS) 30 MG tablet Take 1 tablet (30 mg) by mouth daily 90 tablet 2     polyethylene glycol 0.4%- propylene glycol 0.3% (SYSTANE ULTRA) 0.4-0.3 % SOLN ophthalmic solution Place 1 drop into both eyes 3 times daily       potassium chloride (K-TAB,KLOR-CON) 10 MEQ tablet TAKE ONE TABLET BY MOUTH ONE TIME DAILY 90 tablet 3     prochlorperazine (COMPAZINE) 10 MG tablet        tamsulosin (FLOMAX) 0.4 MG capsule TAKE ONE CAPSULE BY MOUTH ONE TIME DAILY 90 capsule 2     tiZANidine (ZANAFLEX) 4 MG tablet Take 1-2 tablets (4-8 mg) by mouth nightly  as needed 30 tablet 1     traMADol (ULTRAM) 50 MG tablet Take 1 tablet (50 mg) by mouth every 6 hours as needed for severe pain 20 tablet 0     ASPIRIN NOT PRESCRIBED (INTENTIONAL) 1 each continuous prn Antiplatelet medication not prescribed intentionally due to oncology therapy. (Patient not taking: Reported on 1/3/2019) 0 each 0     Allergies   Allergen Reactions     Aspirin        Review of Systems   Constitutional: Negative.  Negative for diaphoresis, fever and weight loss.   HENT: Negative.    Eyes: Negative for pain.   Respiratory: Negative.  Negative for cough and hemoptysis.    Cardiovascular: Negative.  Negative for chest pain and palpitations.   Musculoskeletal: Positive for neck pain.   Skin: Negative.    All other systems reviewed and are negative.      /73   Pulse 103   Wt 83.9 kg (185 lb)   BMI 28.70 kg/m    Physical Exam   Constitutional: He is oriented to person, place, and time. He appears well-developed and well-nourished. No distress.   Neck: Muscular tenderness present. No spinous process tenderness present. Decreased range of motion present. No Brudzinski's sign and no Kernig's sign noted.   Musculoskeletal:        Cervical back: He exhibits decreased range of motion, tenderness, pain and spasm. He exhibits no bony tenderness, no swelling, no edema, no deformity, no laceration and normal pulse.   Neurological: He is alert and oriented to person, place, and time. He has normal strength and normal reflexes. No cranial nerve deficit or sensory deficit.   Skin: Skin is warm, dry and intact.   Distal pulses are 2+ and symmetric.  No peripheral edema.   Psychiatric: He has a normal mood and affect. His behavior is normal. Judgment and thought content normal.   Nursing note and vitals reviewed.        Assessment/Plan:  Chronic neck pain:  With known DDD present on MRI.  He has failed tramadol, tylenol and gabapentin.  Will give percocet #18, prednisone X7days and methocarbamol as needed.   Discussed risks and benefits of medication along with side effects, direction for use.  No driving or operating machinery due to sedation.  He is unable to take NSAIDS as this interacts with his cancer meds.  Recommend RICE.  Will also send to orthopedics for further evaluation and management.  Recheck in clinic if symptoms worsen or if symptoms do not improve.   -     oxyCODONE-acetaminophen (PERCOCET) 5-325 MG tablet; Take 1 tablet by mouth every 6 hours as needed for pain  -     predniSONE (DELTASONE) 20 MG tablet; Take 20 mg by mouth 2 times daily for 7 days.  -     methocarbamol (ROBAXIN) 500 MG tablet; Take 1 tablet (500 mg) by mouth 4 times daily as needed for muscle spasms  -     ORTHO  REFERRAL    DDD (degenerative disc disease), cervical          Litzy See DANNA [place]

## 2019-01-17 NOTE — PROGRESS NOTES
[facility] Care Coordination Contact    [facility] Initial Assessment     Home visit for Initial Health Risk Assessment with Tolu Villeda completed on January 16, 2019    Type of residence:: Private home - stairs  Current living arrangement:: I live in a private home with family     Assessment completed with:: Patient    Current Care Plan  Member currently receiving the following home care services:   none  Member currently receiving the following community resources:  none    Medication Review  Medication reconciliation completed in Epic: Yes  Medication set-up & administration: Independent-does not set up.  Self-administers medications.  Medication Risk Assessment Medication (1 or more, place referral to MTM): N/A: No risk factors identified  MTM Referral Placed: No: No risk factors idenified    Mental/Behavioral Health   Depression Screening: See PHQ assessment flowsheet.   Mental health DX:: No      No current MH services-will place referral for none    Falls Assessment:   Fallen 2 or more times in the past year?: No   Any fall with injury in the past year?: No    ADL/IADL Dependencies:   Dependent ADLs:: Independent  Dependent IADLs:: Shopping    [facility] sponsored benefits: Shared information re: Silver Sneakers/gym memberships, ASA, Calcium +D.    PCA Assessment completed at visit: No     Elderly Waiver Eligibility: No-does not meet criteria    Care Plan & Recommendations: Member will continue with informal support from family as needed. I will mail him an [employer] Choices document.     See [facility] for detailed assessment information.    Follow-Up Plan: Member informed of future contact, plan to f/u with member with a 6 month telephone assessment.  Contact information shared with member and family, encouraged member to call with any questions or concerns at any time.    [place] care continuum providers: Please refer to Health Care Home on the [facility] Problem List to view this patient's  [facility] Care Plan Summary.    Awa Leiva RN  [facility]  [phone]

## 2019-01-18 NOTE — TELEPHONE ENCOUNTER
"Patient calling reporting he was seen in the Urgent Care on 1/15/19 for neck and back pain. Patient was referred to Ortho. Patient reporting he was unable to obtain ortho appointment for 5 more days. Stating he would need a refill of Percocet.   Reviewed with patient Urgent Care is unable to provide refills. Reviewed option for follow up appointment. Patient denies change in symptoms \"maybe a little better.\"    Connected to Central Scheduling.    Nano Mejias RN  [facility]      "

## 2019-01-18 NOTE — PATIENT INSTRUCTIONS
Patient Education     General Neck and Back Pain    Both neck and back pain are usually caused by injury to the muscles or ligaments of the spine. Sometimes the disks that separate each bone of the spine may cause pain by pressing on a nearby nerve. Back and neck pain may appear after a sudden twisting or bending force (such as in a car accident), or sometimes after a simple awkward movement. In either case, muscle spasm is often present and adds to the pain.  Acute neck and back pain usually gets better in 1 to 2 weeks. Pain related to disk disease, arthritis in the spinal joints or spinal stenosis (narrowing of the spinal canal) can become chronic and last for months or years.  Back and neck pain are common problems. Most people feel better in 1 or 2 weeks, and most of the rest in 1 to 2 months. Most people can remain active.  People have and describe pain differently.    Pain can be sharp, stabbing, shooting, aching, cramping, or burning    Movement, standing, bending, lifting, sitting, or walking may worsen the pain    Pain can be localized to one spot or area, or it can be more generalized    Pain can spread or radiate upwards, downwards, to the front, or go down your arms    Muscle spasm may occur.  Most of the time mechanical problems with the muscles or spine cause the pain. it is usually caused by an injury, whether known or not, to the muscles or ligaments. While illnesses can cause back pain, it is usually not caused by a serious illness. Pain is usually related to physical activity, whether sports, exercise, work, or normal activity. Sometimes it can occur without an identifiable cause. This can happen simply by stretching or moving wrong, without noting pain at the time. Other causes include:    Overexertion, lifting, pushing, pulling incorrectly or too aggressively.    Sudden twisting, bending or stretching from an accident (car or fall), or accidental movement.    Poor posture    Poor conditioning,  lack of regular exercise    Spinal disc disease or arthritis    Stress    Pregnancy, or illness like appendicitis, bladder or kidney infection, pelvic infections   Home care    For neck pain: Use a comfortable pillow that supports the head and keeps the spine in a neutral position. The position of the head should not be tilted forward or backward.    When in bed, try to find a position of comfort. A firm mattress is best. Try lying flat on your back with pillows under your knees. You can also try lying on your side with your knees bent up towards your chest and a pillow between your knees.    At first, do not try to stretch out the sore spots. If there is a strain, it is not like the good soreness you get after exercising without an injury. In this case, stretching may make it worse.    Don't sit for long periods, as in long car rides or other travel. This puts more stress on the lower back than standing or walking.    During the first 24 to 72 hours after an injury, apply an ice pack to the painful area for 20 minutes and then remove it for 20 minutes over a period of 60 to 90 minutes or several times a day.     You can alternate ice and heat therapies. Talk with your healthcare provider about the best treatment for your back or neck pain. As a safety precaution, do not use a heating pad at bedtime. Sleeping with a heating pad can lead to skin burns or tissue damage.    Therapeutic massage can help relax the back and neck muscles without stretching them.    Be aware of safe lifting methods and do not lift anything over 15 pounds until all the pain is gone.  Medicines  Talk to your healthcare provider before using medicine, especially if you have other medical problems or are taking other medicines.    You may use over-the-counter medicine to control pain, unless another pain medicine was prescribed. If you have chronic conditions like diabetes, liver or kidney disease, stomach ulcers,  gastrointestinal bleeding, or  are taking blood thinner medicines.    Be careful if you are given pain medicines, narcotics, or medicine for muscle spasm. They can cause drowsiness, and can affect your coordination, reflexes, and judgment. Do not drive or operate heavy machinery.  Follow-up care  Follow up with your healthcare provider, or as advised. Physical therapy or further tests may be needed.  If X-rays were taken, you will be notified of any new findings that may affect your care.  Call 911  Call 911 if any of the following occur:    Trouble breathing    Confusion    Very drowsy or trouble awakening    Fainting or loss of consciousness    Rapid or very slow heart rate    Loss of bowel or bladder control  When to seek medical advice  Call your healthcare provider right away if any of these occur:    Pain becomes worse or spreads into your arms or legs    Weakness, numbness or pain in one or both arms or legs    Numbness in the groin area    Difficulty walking    Fever of 100.4 F (38 C) or higher, or as directed by your healthcare provider  Date Last Reviewed: 7/1/2016 2000-2018 [employer]. [address] [place] [id number]. All rights reserved. This information is not intended as a substitute for professional medical care. Always follow your healthcare professional's instructions.

## 2019-01-18 NOTE — PROGRESS NOTES
SUBJECTIVE:   Tolu Villeda is a 64 year old male who presents to clinic today for the following health issues:      Musculoskeletal problem/pain      Duration: since September     Description  Location: neck pain    Intensity:  moderate    Accompanying signs and symptoms: radiation of pain to both arms     History  Previous similar problem: no   Previous evaluation:  MRI and orthopedic evaluation    Precipitating or alleviating factors:  Trauma or overuse: no   Aggravating factors include: lying down makes it worst     Therapies tried and outcome: epidural, another is scheduled for 2019, prednisone, oxycodone, robaxin       Problem list and histories reviewed & adjusted, as indicated.  Additional history: as documented    Patient Active Problem List   Diagnosis     Hyperlipidemia with target LDL less than 100     Smoking-quit in      GERD (gastroesophageal reflux disease)     ED (erectile dysfunction)     BMI 34.0-34.9,adult     Advanced directives, counseling/discussion     Microalbuminuria     Adenocarcinoma of lung (H)     Insomnia     Acneiform rash     Elevated prostate specific antigen (PSA)     Low back pain     Malignant neoplasm of lower lobe of left lung (H)     Type 2 diabetes mellitus with hyperglycemia, without long-term current use of insulin (H)     Essential hypertension with goal blood pressure less than 140/90     Health Care Home     Past Surgical History:   Procedure Laterality Date     COLONOSCOPY  2014    [facility]     SURGICAL HISTORY OF -   2009    Left testicular surgery with benign growth/probable hydrocele       Social History     Tobacco Use     Smoking status: Former Smoker     Packs/day: 1.00     Years: 50.00     Pack years: 50.00     Types: Cigarettes     Last attempt to quit: 2013     Years since quittin.4     Smokeless tobacco: Never Used   Substance Use Topics     Alcohol use: No     Frequency: Never     Comment: occasionally     Family  History   Problem Relation Age of Onset     Diabetes Mother      Hypertension Mother      Diabetes Father      Hypertension Father      Alcohol/Drug Father      Hypertension Maternal Grandmother      Cerebrovascular Disease Maternal Grandmother      Hypertension Maternal Grandfather      Alcohol/Drug Maternal Grandfather      Hypertension Paternal Grandmother      Hypertension Paternal Grandfather      Asthma No family hx of      C.A.D. No family hx of      Breast Cancer No family hx of      Cancer - colorectal No family hx of      Prostate Cancer No family hx of      Alzheimer Disease No family hx of      Allergies No family hx of      Anesthesia Reaction No family hx of      Arthritis No family hx of      Blood Disease No family hx of      Cancer No family hx of      Hearing Loss No family hx of      Family History Negative No family hx of      Thyroid Disease No family hx of      Respiratory No family hx of      Osteoporosis No family hx of      Obesity No family hx of      Neurologic Disorder No family hx of      Psychotic Disorder No family hx of      Musculoskeletal Disorder No family hx of      Lipids No family hx of      Heart Disease No family hx of      Gynecology No family hx of      Genitourinary Problems No family hx of      Genetic Disorder No family hx of      Gastrointestinal Disease No family hx of      Eye Disorder No family hx of      Endocrine Disease No family hx of      Depression No family hx of      Connective Tissue Disorder No family hx of      Congenital Anomalies No family hx of      Circulatory No family hx of      Cardiovascular No family hx of      Kidney Disease No family hx of      Anxiety Disorder No family hx of      Mental Illness No family hx of      Substance Abuse No family hx of      Other Cancer No family hx of      Colon Cancer No family hx of      Hyperlipidemia No family hx of      Glaucoma No family hx of      Macular Degeneration No family hx of          Current  Outpatient Medications   Medication Sig Dispense Refill     amLODIPine (NORVASC) 10 MG tablet Take 1 tablet (10 mg) by mouth daily 90 tablet 1     ASPIRIN NOT PRESCRIBED (INTENTIONAL) 1 each continuous prn Antiplatelet medication not prescribed intentionally due to oncology therapy. 0 each 0     atorvastatin (LIPITOR) 40 MG tablet Take 1 tablet (40 mg) by mouth daily 90 tablet 3     blood glucose monitoring (DEISY CONTOUR) test strip Use 3 times per day 200 strip 5     carvedilol (COREG) 25 MG tablet TAKE ONE TABLET BY MOUTH TWICE DAILY WITH MEALS 180 tablet 1     clindamycin (CLINDAMAX) 1 % gel Use on face 2 times a day 30 g 3     clobetasol propionate (CLOBEX) 0.05 % SHAM Externally apply 1 Bottle topically daily Apply to scalp prior to showering.  Leave on for 15 min 118 mL 1     diphenoxylate-atropine (LOMOTIL) 2.5-0.025 MG per tablet Take 1 tablet by mouth 4 times daily as needed for diarrhea 40 tablet 3     famotidine (PEPCID) 40 MG tablet TAKE 1 TABLET (40 MG) BY MOUTH AT BEDTIME 90 tablet 3     fluocinonide (LIDEX) 0.05 % cream        HYDROcodone-acetaminophen (NORCO) 5-325 MG per tablet Take 1 tablet by mouth every 4 hours as needed for moderate to severe pain       linagliptin-metFORMIN (JENTADUETO) 2.5-1000 MG per tablet TAKE ONE TABLET BY MOUTH TWICE DAILY with food 180 tablet 1     lisinopril (PRINIVIL/ZESTRIL) 40 MG tablet Take 1 tablet (40 mg) by mouth 2 times daily 180 tablet 1     methocarbamol (ROBAXIN) 500 MG tablet Take 1 tablet (500 mg) by mouth 4 times daily as needed for muscle spasms 30 tablet 0     minocycline (DYNACIN) 100 MG tablet Take 100 mg by mouth daily as needed  28 tablet 1     mometasone (ELOCON) 0.1 % cream APPLY 1 GRAM TOPICALLY DAILY 45 g 1     MULTIVITAMIN OR 1 TABLET DAILY       ORDER FOR DME Equipment being ordered: Glucometer test strips for three times daily testing--fill with appropriate brand for the new glucometer 400 strip 3     ORDER FOR DME Equipment being ordered:  Home Glucometer 1 Units 1     osimertinib (TAGRISSO) 80 MG tablet Take 80 mg by mouth daily       oxyCODONE-acetaminophen (PERCOCET) 5-325 MG tablet Take 1 tablet by mouth every 6 hours as needed for pain 18 tablet 0     pioglitazone (ACTOS) 30 MG tablet Take 1 tablet (30 mg) by mouth daily 90 tablet 2     polyethylene glycol 0.4%- propylene glycol 0.3% (SYSTANE ULTRA) 0.4-0.3 % SOLN ophthalmic solution Place 1 drop into both eyes 3 times daily       potassium chloride (K-TAB,KLOR-CON) 10 MEQ tablet TAKE ONE TABLET BY MOUTH ONE TIME DAILY 90 tablet 3     predniSONE (DELTASONE) 20 MG tablet Take 20 mg by mouth 2 times daily for 7 days. 14 tablet 0     prochlorperazine (COMPAZINE) 10 MG tablet        tamsulosin (FLOMAX) 0.4 MG capsule TAKE ONE CAPSULE BY MOUTH ONE TIME DAILY 90 capsule 2     tiZANidine (ZANAFLEX) 4 MG tablet Take 1-2 tablets (4-8 mg) by mouth nightly as needed 30 tablet 1     traMADol (ULTRAM) 50 MG tablet Take 1 tablet (50 mg) by mouth every 6 hours as needed for severe pain 20 tablet 0     Allergies   Allergen Reactions     Aspirin      Recent Labs   Lab Test 01/03/19  1253 08/07/18  1209 03/12/18  1223  07/03/17  1209 11/21/16  1044  01/19/16  1210   A1C 6.4* 5.7* 5.6   < > 5.7 5.7   < > 5.9   LDL  --   --  43  --  43 51   < > 32   HDL  --   --  64  --  55 49   < > 51   TRIG  --   --  72  --  100 107   < > 103   ALT 37 25 22  --  23 22  --  16   CR  --  1.18 1.16   < > 1.09 1.02  --  1.14   GFRESTIMATED  --  62 63   < > 68 74  --  65   GFRESTBLACK  --  75 76   < > 82 89  --  79   POTASSIUM  --  5.1 4.5   < > 4.5 4.7  --  4.5   TSH  --   --  3.08  --   --   --   --  3.51    < > = values in this interval not displayed.      BP Readings from Last 3 Encounters:   01/18/19 138/75   01/15/19 148/73   01/03/19 90/45    Wt Readings from Last 3 Encounters:   01/18/19 82.6 kg (182 lb)   01/15/19 83.9 kg (185 lb)   01/03/19 88 kg (194 lb)                  Labs reviewed in EPIC    Reviewed and updated as  needed this visit by clinical staff  Allergies  Meds       Reviewed and updated as needed this visit by Provider         ROS:  Constitutional, HEENT, cardiovascular, pulmonary, gi and gu systems are negative, except as otherwise noted.    OBJECTIVE:     /75   Pulse 96   Temp 97.8  F (36.6  C) (Oral)   Wt 82.6 kg (182 lb)   SpO2 99%   BMI 28.23 kg/m    Body mass index is 28.23 kg/m .  GENERAL: alert and no distress  EYES: Eyes grossly normal to inspection, PERRL and conjunctivae and sclerae normal  NECK: no adenopathy, no asymmetry, masses, or scars and thyroid normal to palpation  RESP: lungs clear to auscultation - no rales, rhonchi or wheezes  CV: regular rates and rhythm, normal S1 S2, no S3 or S4 and no murmur, click or rub  MS: subjective neck pain, ROM normal, no cervical spinal or paraspinal tenderness, normal upper and lower extremities strength, sensation to touch and pressure intact, reflexes 2+  NEURO: Normal strength and tone, mentation intact and speech normal        ASSESSMENT/PLAN:         ICD-10-CM    1. Chronic neck pain M54.2 oxyCODONE-acetaminophen (PERCOCET) 5-325 MG tablet    G89.29 DISCONTINUED: oxyCODONE-acetaminophen (PERCOCET) 5-325 MG tablet     64-year-old male presents with chronic neck pain, has been seen by orthopedic, MRI findings consistent with degenerative disc disease, had epidural injection last week.  Physical examination as described above.  Suspect symptoms secondary to cervical spinal degenerative disc disease.  Patient would like Percocet refilled, risks and benefits explained in detail, limited supply prescribed as per patient desire.  Patient has an appointment with orthopedic on 24 January 2019.  Written information provided.  All questions answered.      Patient Instructions     Patient Education     General Neck and Back Pain    Both neck and back pain are usually caused by injury to the muscles or ligaments of the spine. Sometimes the disks that separate  each bone of the spine may cause pain by pressing on a nearby nerve. Back and neck pain may appear after a sudden twisting or bending force (such as in a car accident), or sometimes after a simple awkward movement. In either case, muscle spasm is often present and adds to the pain.  Acute neck and back pain usually gets better in 1 to 2 weeks. Pain related to disk disease, arthritis in the spinal joints or spinal stenosis (narrowing of the spinal canal) can become chronic and last for months or years.  Back and neck pain are common problems. Most people feel better in 1 or 2 weeks, and most of the rest in 1 to 2 months. Most people can remain active.  People have and describe pain differently.    Pain can be sharp, stabbing, shooting, aching, cramping, or burning    Movement, standing, bending, lifting, sitting, or walking may worsen the pain    Pain can be localized to one spot or area, or it can be more generalized    Pain can spread or radiate upwards, downwards, to the front, or go down your arms    Muscle spasm may occur.  Most of the time mechanical problems with the muscles or spine cause the pain. it is usually caused by an injury, whether known or not, to the muscles or ligaments. While illnesses can cause back pain, it is usually not caused by a serious illness. Pain is usually related to physical activity, whether sports, exercise, work, or normal activity. Sometimes it can occur without an identifiable cause. This can happen simply by stretching or moving wrong, without noting pain at the time. Other causes include:    Overexertion, lifting, pushing, pulling incorrectly or too aggressively.    Sudden twisting, bending or stretching from an accident (car or fall), or accidental movement.    Poor posture    Poor conditioning, lack of regular exercise    Spinal disc disease or arthritis    Stress    Pregnancy, or illness like appendicitis, bladder or kidney infection, pelvic infections   Home  care    For neck pain: Use a comfortable pillow that supports the head and keeps the spine in a neutral position. The position of the head should not be tilted forward or backward.    When in bed, try to find a position of comfort. A firm mattress is best. Try lying flat on your back with pillows under your knees. You can also try lying on your side with your knees bent up towards your chest and a pillow between your knees.    At first, do not try to stretch out the sore spots. If there is a strain, it is not like the good soreness you get after exercising without an injury. In this case, stretching may make it worse.    Don't sit for long periods, as in long car rides or other travel. This puts more stress on the lower back than standing or walking.    During the first 24 to 72 hours after an injury, apply an ice pack to the painful area for 20 minutes and then remove it for 20 minutes over a period of 60 to 90 minutes or several times a day.     You can alternate ice and heat therapies. Talk with your healthcare provider about the best treatment for your back or neck pain. As a safety precaution, do not use a heating pad at bedtime. Sleeping with a heating pad can lead to skin burns or tissue damage.    Therapeutic massage can help relax the back and neck muscles without stretching them.    Be aware of safe lifting methods and do not lift anything over 15 pounds until all the pain is gone.  Medicines  Talk to your healthcare provider before using medicine, especially if you have other medical problems or are taking other medicines.    You may use over-the-counter medicine to control pain, unless another pain medicine was prescribed. If you have chronic conditions like diabetes, liver or kidney disease, stomach ulcers,  gastrointestinal bleeding, or are taking blood thinner medicines.    Be careful if you are given pain medicines, narcotics, or medicine for muscle spasm. They can cause drowsiness, and can affect  your coordination, reflexes, and judgment. Do not drive or operate heavy machinery.  Follow-up care  Follow up with your healthcare provider, or as advised. Physical therapy or further tests may be needed.  If X-rays were taken, you will be notified of any new findings that may affect your care.  Call 911  Call 911 if any of the following occur:    Trouble breathing    Confusion    Very drowsy or trouble awakening    Fainting or loss of consciousness    Rapid or very slow heart rate    Loss of bowel or bladder control  When to seek medical advice  Call your healthcare provider right away if any of these occur:    Pain becomes worse or spreads into your arms or legs    Weakness, numbness or pain in one or both arms or legs    Numbness in the groin area    Difficulty walking    Fever of 100.4 F (38 C) or higher, or as directed by your healthcare provider  Date Last Reviewed: 7/1/2016 2000-2018 [employer]. [address] [place] [address], [place] [id number]. All rights reserved. This information is not intended as a substitute for professional medical care. Always follow your healthcare professional's instructions.               Ricardo Do MD  [facility]

## 2019-01-24 NOTE — PROGRESS NOTES
[facility] Care Coordination Contact    Received after visit chart from care coordinator.  Completed following tasks: Mailed copy of care plan to client and Entered MMIS  Chart was returned to CC.       Hortencia Yarbrough  Case Management Specialist   [facility]   [phone]

## 2019-01-24 NOTE — LETTER
January 24, 2019    ALCIRA RUBI  [address] [address] [address]        Dear Alcira:    At [facility], we are dedicated to improving your health and well-being. Enclosed is the Comprehensive Care Plan that we developed with you on 1/16/19. Please review the Care Plan carefully.    As a reminder, some of the things we discussed at your visit include:    Your physical and mental health    Ways to reduce falls    Health care needs you may have    Don t forget to contact your care coordinator if you:    Have been hospitalized or plan to be hospitalized     Have had a fall     Have experienced a change in physical health    Are experiencing emotional problems     If you do not agree with your Care Plan, have questions about it, or have experienced a change in your needs, please call me at [phone]. If you are hearing impaired, please call the [place] [employer] at 151 or [phone] (gcxhnw-wf-mudnaw relay service).    Sincerely,        Awa Leiva RN    E-mail: [email or web address]  Phone: [phone]      [facility]+[id number] ([id number])     [id number] (11/18)

## 2019-01-24 NOTE — DISCHARGE SUMMARY
AFTER YOU GO HOME    ? DO relax; minimize your activity for 24 hours  ? You may resume normal activity tomorrow  ? You may remove the bandage in the evening or next morning  ? You may resume bathing the next day  ? Drink at least 4 extra glasses of fluid today if not on fluid restrictions  ? DO NOT drive or operate machinery at home or at work for at least 24 hours      VISIT THE EMERGENCY ROOM OR URGENT CARE IF:    ? There is redness or swelling at the injection site  ? There is discharge from the injection site  ? You develop a temperature of 101  F or greater      ADDITIONAL INSTRUCTIONS:     ? You may resume your Coumadin or other blood thinner at your regular dose today.  Follow up with your physician to have your INR rechecked if indicated.  ? If you gain no relief from the injection after two (2) weeks, follow-up with your provider for your options.        Contacts:    During business hours from 8 to 5 pm, you may call [phone] to reach a nurse advisor at [facility].  After hours, call [facility]  [phone].  Ask for the Radiologist on-call.  Someone is on-call 24 hrs/day.  [facility] Toll Free Number   [phone]

## 2019-01-24 NOTE — PROGRESS NOTES
: Tolu Villeda was seen in X-ray today for a cervical epidural injection. Patient rated pain before procedure 7/10. After procedure patient rated pain ***/10. This pain level is acceptable to patient. Patient discharged home with Daughter in Law, Mariaelena.

## 2019-01-24 NOTE — LETTER
[facility] sms[facility] Advance Care Planning       Tolu Villeda  [address] [address] [address]      Dear Tolu,    You shared with me your interest in receiving information on Advance Care Planning and Health Care Directives. Discussing and making decisions about this part of our health is very important.  A Health Care Directive is a written document that outlines your goals, values, beliefs and choices for health care and medical treatment in the event you are unable to speak for yourself.     We greatly value the opportunity to assist you in documenting your choices and to honor your   wishes. We ve enclosed advance care planning information to help you get started thinking about your values and goals. We have several options for additional resources:       Health Care Directives and Advance Care Planning resources can be viewed and printed   for free at our web site:  [email or web address].       Free group classes on Advance Care Planning and completing a Health Care Directive are available at multiple locations and times. These classes are led by trained staff who will provide information and guide you through a Health Care Directive.  They can also review, notarize and add your Health Care Directive to your medical record. [place] for a class at [email or web address] or by calling [employer] [facility] at [phone] or toll free [phone].      COPIES of completed Health Care Directives can be brought or mailed to any of our   locations, including the address listed below. You can also email a copy to [email or web address] .      Email or call me at the contact information listed below for questions, assistance, or to   make an appointment to discuss creating a Health Care Directive. You can also contact   our [facility] smsPREP Department for questions or assistance.       Sincerely,     Awa Leiva RN

## 2019-02-26 NOTE — TELEPHONE ENCOUNTER
Jentadueto:  Prescription approved per [facility] Refill Protocol.    Tiara Bill, RN, BSN

## 2019-03-04 ENCOUNTER — TRANSFERRED RECORDS (OUTPATIENT)
Dept: HEALTH INFORMATION MANAGEMENT | Facility: CLINIC | Age: 64
End: 2019-03-04

## 2019-03-05 ENCOUNTER — PATIENT OUTREACH (OUTPATIENT)
Dept: GERIATRIC MEDICINE | Facility: CLINIC | Age: 64
End: 2019-03-05

## 2019-03-05 NOTE — PROGRESS NOTES
[facility] Care Coordination Contact    Notified by Epic that member [facility].    PCP notified. No formal services in place.      Called family member/caregiver to offer condolences.   For [facility]:  Death Notification form completed and faxed to [facility].    Chart reviewed and this CC's encounter closed. Chart handed off to CMS to process disenrollment tasks.    Awa Leiva RN  [facility]  [phone]

## 2020-08-03 NOTE — NURSING NOTE
"Chief Complaint   Patient presents with     Consult       Initial /65  Pulse 86  Temp 97.1  F (36.2  C) (Oral)  Resp 20  Ht 5' 7.5\" (1.715 m)  Wt 195 lb (88.5 kg)  SpO2 100%  BMI 30.09 kg/m2 Estimated body mass index is 30.09 kg/(m^2) as calculated from the following:    Height as of this encounter: 5' 7.5\" (1.715 m).    Weight as of this encounter: 195 lb (88.5 kg).    Carlene Bui CMA    " GOAL: Pt will ambulate 200' independently without RW in 2 weeks.

## 2021-05-28 ENCOUNTER — RECORDS - HEALTHEAST (OUTPATIENT)
Dept: ADMINISTRATIVE | Facility: CLINIC | Age: 66
End: 2021-05-28

## 2021-06-19 NOTE — TELEPHONE ENCOUNTER
Letter copied into letter format.  Sent to patient via mail.  Patient updated and agrees to plan.  JW   Patient returned call and would like to get the refill today as he is out of medication.

## 2022-04-26 NOTE — TELEPHONE ENCOUNTER
Pending Prescriptions:                       Disp   Refills    lisinopril (PRINIVIL/ZESTRIL) 40 MG table*60 tab*0            Sig: TAKE ONE TABLET BY MOUTH TWICE DAILY           Last Written Prescription Date: 5/9/2017  Last Fill Quantity: 60, # refills: 0  Last Office Visit with G, P or [facility] prescribing provider: 7/3/2017       Potassium   Date Value Ref Range Status   07/03/2017 4.5 3.4 - 5.3 mmol/L Final     Creatinine   Date Value Ref Range Status   07/03/2017 1.09 0.66 - 1.25 mg/dL Final     BP Readings from Last 3 Encounters:   07/03/17 130/56   11/21/16 118/60   08/29/16 150/70       
Prescription approved per [facility] Refill Protocol.  Jorje Lopez, RN, BSN        
Quality 130: Documentation Of Current Medications In The Medical Record: Current Medications Documented
Detail Level: Detailed

## 2024-06-17 PROBLEM — Z76.89 HEALTH CARE HOME: Status: RESOLVED | Noted: 2018-02-16 | Resolved: 2024-06-17

## 2025-06-07 NOTE — TELEPHONE ENCOUNTER
Discharge papers is with the patient, unable to explain it to her since she left without notice, told her I will be back but she's gone. Called her via phone and she said she understands everything on it, claims she's in a hurry and don't want to miss her cab. When asked about her peripheral IV she claims she removed it already and placed it on the sharp container. Meds to bed was done prior to discharge.   Reason for Call:  Medication or medication refill:    Do you use a [facility]?  Name of the pharmacy and phone number for the current request:      Name of the medication requested: tramadol    Other request: Patient only has enough for today. He does not see the back specialist until 1-24-19    Can we leave a detailed message on this number? NO    Phone number patient can be reached at: Home number on file [phone] (home)    Best Time:     Call taken on 1/7/2019 at 10:17 AM by Lisset Connor